# Patient Record
Sex: FEMALE | Race: WHITE | NOT HISPANIC OR LATINO | Employment: FULL TIME | URBAN - METROPOLITAN AREA
[De-identification: names, ages, dates, MRNs, and addresses within clinical notes are randomized per-mention and may not be internally consistent; named-entity substitution may affect disease eponyms.]

---

## 2017-08-03 ENCOUNTER — ALLSCRIPTS OFFICE VISIT (OUTPATIENT)
Dept: OTHER | Facility: OTHER | Age: 32
End: 2017-08-03

## 2017-08-14 ENCOUNTER — GENERIC CONVERSION - ENCOUNTER (OUTPATIENT)
Dept: OTHER | Facility: OTHER | Age: 32
End: 2017-08-14

## 2017-08-14 ENCOUNTER — APPOINTMENT (OUTPATIENT)
Dept: PHYSICAL THERAPY | Facility: CLINIC | Age: 32
End: 2017-08-14
Payer: COMMERCIAL

## 2017-08-14 PROCEDURE — 97140 MANUAL THERAPY 1/> REGIONS: CPT

## 2017-08-14 PROCEDURE — 97161 PT EVAL LOW COMPLEX 20 MIN: CPT

## 2018-01-13 VITALS
HEIGHT: 69 IN | SYSTOLIC BLOOD PRESSURE: 128 MMHG | DIASTOLIC BLOOD PRESSURE: 86 MMHG | HEART RATE: 80 BPM | BODY MASS INDEX: 43.4 KG/M2 | RESPIRATION RATE: 18 BRPM | WEIGHT: 293 LBS

## 2018-02-14 ENCOUNTER — TELEPHONE (OUTPATIENT)
Dept: FAMILY MEDICINE CLINIC | Facility: CLINIC | Age: 33
End: 2018-02-14

## 2018-02-15 DIAGNOSIS — I10 BENIGN ESSENTIAL HTN: Primary | ICD-10-CM

## 2018-03-30 ENCOUNTER — OFFICE VISIT (OUTPATIENT)
Dept: FAMILY MEDICINE CLINIC | Facility: CLINIC | Age: 33
End: 2018-03-30
Payer: COMMERCIAL

## 2018-03-30 VITALS
BODY MASS INDEX: 43.4 KG/M2 | RESPIRATION RATE: 16 BRPM | WEIGHT: 293 LBS | DIASTOLIC BLOOD PRESSURE: 98 MMHG | HEIGHT: 69 IN | HEART RATE: 96 BPM | SYSTOLIC BLOOD PRESSURE: 145 MMHG

## 2018-03-30 DIAGNOSIS — R94.31 ABNORMAL EKG: ICD-10-CM

## 2018-03-30 DIAGNOSIS — I10 ESSENTIAL HYPERTENSION: Primary | ICD-10-CM

## 2018-03-30 DIAGNOSIS — F41.8 DEPRESSION WITH ANXIETY: ICD-10-CM

## 2018-03-30 PROCEDURE — 3008F BODY MASS INDEX DOCD: CPT | Performed by: NURSE PRACTITIONER

## 2018-03-30 PROCEDURE — 93000 ELECTROCARDIOGRAM COMPLETE: CPT | Performed by: NURSE PRACTITIONER

## 2018-03-30 PROCEDURE — 99214 OFFICE O/P EST MOD 30 MIN: CPT | Performed by: NURSE PRACTITIONER

## 2018-03-30 RX ORDER — BIOTIN 1 MG
1 TABLET ORAL DAILY
COMMUNITY
End: 2018-03-30 | Stop reason: ALTCHOICE

## 2018-03-30 RX ORDER — LISINOPRIL 20 MG/1
1 TABLET ORAL DAILY
COMMUNITY
End: 2019-09-10 | Stop reason: SDUPTHER

## 2018-03-30 RX ORDER — UBIDECARENONE 75 MG
1 CAPSULE ORAL DAILY
COMMUNITY
End: 2018-03-30 | Stop reason: ALTCHOICE

## 2018-03-30 NOTE — PROGRESS NOTES
Chief Complaint   Patient presents with    Follow-up     Assessment/Plan:    1  Essential hypertension  Please resume the lisinopril and get the blood work done that you have the orders for  Please come back in 3 months to que this  - POCT ECG    2  Depression with anxiety  This is good with no meds  3  Abnormal EKG  The st changes are new form the old ekg   We have referred you to cardiology for eval as we do not know your fathers family history   rto 3 months  Subjective:      Patient ID: Blue Greene is a 28 y o  female  Here today to k on BP and depression  Has gotten a new job and is really feel ing much better in the new envrionment  Did stop the lisinopril only because she kept forgetting to take it  Will get back on track  In addition, forgot to get the blood work done  Is still working on weight loss with diet and exercise  The following portions of the patient's history were reviewed and updated as appropriate: allergies, current medications, past family history, past medical history, past social history, past surgical history and problem list     History reviewed  No pertinent past medical history  History reviewed  No pertinent surgical history  Family History   Problem Relation Age of Onset    No Known Problems Mother     No Known Problems Father     Mental illness Neg Hx     Substance Abuse Neg Hx      Social History   Social History     Social History    Marital status: Single     Spouse name: N/A    Number of children: N/A    Years of education: N/A     Occupational History    Not on file  Social History Main Topics    Smoking status: Never Smoker    Smokeless tobacco: Never Used    Alcohol use No    Drug use: No    Sexual activity: Not on file     Other Topics Concern    Not on file     Social History Narrative    No narrative on file     Review of Systems   Constitutional: Negative  Respiratory: Negative  Cardiovascular: Negative  Musculoskeletal: Negative  Skin: Negative  Neurological: Negative  Hematological: Negative  Psychiatric/Behavioral: Negative  Vitals:    03/30/18 1452   BP: 120/80   BP Location: Left arm   Patient Position: Sitting   Pulse: 96   Resp: 16   Weight: 133 kg (293 lb)   Height: 5' 8 5" (1 74 m)       Objective:  Office Visit on 03/30/2018   Component Date Value Ref Range Status    OTHER 03/30/2018    Final    03/30/2018          Physical Exam   Constitutional: She is oriented to person, place, and time  She appears well-developed and well-nourished  Neck: Normal range of motion  Neck supple  Cardiovascular: Normal rate, regular rhythm, normal heart sounds and intact distal pulses  Pulmonary/Chest: Effort normal and breath sounds normal  No respiratory distress  She has no wheezes  She has no rales  Musculoskeletal: Normal range of motion  Neurological: She is alert and oriented to person, place, and time  Skin: Skin is warm and dry  Psychiatric: She has a normal mood and affect   Her behavior is normal  Judgment and thought content normal

## 2019-08-23 DIAGNOSIS — I10 ESSENTIAL HYPERTENSION: Primary | ICD-10-CM

## 2019-09-10 ENCOUNTER — TELEPHONE (OUTPATIENT)
Dept: FAMILY MEDICINE CLINIC | Facility: CLINIC | Age: 34
End: 2019-09-10

## 2019-09-10 RX ORDER — LISINOPRIL 20 MG/1
20 TABLET ORAL DAILY
Qty: 30 TABLET | Refills: 0 | Status: SHIPPED | OUTPATIENT
Start: 2019-09-10 | End: 2019-10-03 | Stop reason: SDUPTHER

## 2019-09-10 NOTE — TELEPHONE ENCOUNTER
Patient called back and booked an appointment on 10/3/2019  In the meantime her Lisinopril ran out, can you refill for # 30 only? Also, she mentioned she had to get labs done    Can you enter an order for labs you want done for her visit     She uses [facility]          Pt # [phone]

## 2019-09-23 ENCOUNTER — TELEPHONE (OUTPATIENT)
Dept: FAMILY MEDICINE CLINIC | Facility: CLINIC | Age: 34
End: 2019-09-23

## 2019-10-03 ENCOUNTER — OFFICE VISIT (OUTPATIENT)
Dept: FAMILY MEDICINE CLINIC | Facility: CLINIC | Age: 34
End: 2019-10-03
Payer: COMMERCIAL

## 2019-10-03 VITALS
BODY MASS INDEX: 42.8 KG/M2 | WEIGHT: 289 LBS | SYSTOLIC BLOOD PRESSURE: 128 MMHG | RESPIRATION RATE: 14 BRPM | HEIGHT: 69 IN | HEART RATE: 88 BPM | DIASTOLIC BLOOD PRESSURE: 88 MMHG

## 2019-10-03 DIAGNOSIS — I10 ESSENTIAL HYPERTENSION: Primary | ICD-10-CM

## 2019-10-03 DIAGNOSIS — E66.01 MORBID OBESITY WITH BMI OF 40.0-44.9, ADULT (HCC): ICD-10-CM

## 2019-10-03 LAB
ALBUMIN SERPL-MCNC: 4.2 G/DL (ref 3.6–5.1)
ALBUMIN/GLOB SERPL: 1.7 (CALC) (ref 1–2.5)
ALP SERPL-CCNC: 54 U/L (ref 33–115)
ALT SERPL-CCNC: 13 U/L (ref 6–29)
AST SERPL-CCNC: 15 U/L (ref 10–30)
BASOPHILS # BLD AUTO: 52 CELLS/UL (ref 0–200)
BASOPHILS NFR BLD AUTO: 0.8 %
BILIRUB SERPL-MCNC: 0.9 MG/DL (ref 0.2–1.2)
BUN SERPL-MCNC: 9 MG/DL (ref 7–25)
BUN/CREAT SERPL: NORMAL (CALC) (ref 6–22)
CALCIUM SERPL-MCNC: 9.2 MG/DL (ref 8.6–10.2)
CHLORIDE SERPL-SCNC: 101 MMOL/L (ref 98–110)
CHOLEST SERPL-MCNC: 183 MG/DL
CHOLEST/HDLC SERPL: 3.3 (CALC)
CO2 SERPL-SCNC: 30 MMOL/L (ref 20–32)
CREAT SERPL-MCNC: 0.85 MG/DL (ref 0.5–1.1)
EOSINOPHIL # BLD AUTO: 202 CELLS/UL (ref 15–500)
EOSINOPHIL NFR BLD AUTO: 3.1 %
ERYTHROCYTE [DISTWIDTH] IN BLOOD BY AUTOMATED COUNT: 12.4 % (ref 11–15)
GLOBULIN SER CALC-MCNC: 2.5 G/DL (CALC) (ref 1.9–3.7)
GLUCOSE SERPL-MCNC: 79 MG/DL (ref 65–99)
HCT VFR BLD AUTO: 41.8 % (ref 35–45)
HDLC SERPL-MCNC: 56 MG/DL
HGB BLD-MCNC: 13.8 G/DL (ref 11.7–15.5)
LDLC SERPL CALC-MCNC: 108 MG/DL (CALC)
LYMPHOCYTES # BLD AUTO: 2509 CELLS/UL (ref 850–3900)
LYMPHOCYTES NFR BLD AUTO: 38.6 %
MCH RBC QN AUTO: 29.1 PG (ref 27–33)
MCHC RBC AUTO-ENTMCNC: 33 G/DL (ref 32–36)
MCV RBC AUTO: 88.2 FL (ref 80–100)
MONOCYTES # BLD AUTO: 332 CELLS/UL (ref 200–950)
MONOCYTES NFR BLD AUTO: 5.1 %
NEUTROPHILS # BLD AUTO: 3406 CELLS/UL (ref 1500–7800)
NEUTROPHILS NFR BLD AUTO: 52.4 %
NONHDLC SERPL-MCNC: 127 MG/DL (CALC)
PLATELET # BLD AUTO: 290 THOUSAND/UL (ref 140–400)
PMV BLD REES-ECKER: 10.1 FL (ref 7.5–12.5)
POTASSIUM SERPL-SCNC: 4.2 MMOL/L (ref 3.5–5.3)
PROT SERPL-MCNC: 6.7 G/DL (ref 6.1–8.1)
RBC # BLD AUTO: 4.74 MILLION/UL (ref 3.8–5.1)
SL AMB EGFR AFRICAN AMERICAN: 104 ML/MIN/1.73M2
SL AMB EGFR NON AFRICAN AMERICAN: 90 ML/MIN/1.73M2
SODIUM SERPL-SCNC: 138 MMOL/L (ref 135–146)
TRIGL SERPL-MCNC: 93 MG/DL
TSH SERPL-ACNC: 3.57 MIU/L
WBC # BLD AUTO: 6.5 THOUSAND/UL (ref 3.8–10.8)

## 2019-10-03 PROCEDURE — 3079F DIAST BP 80-89 MM HG: CPT | Performed by: NURSE PRACTITIONER

## 2019-10-03 PROCEDURE — 99214 OFFICE O/P EST MOD 30 MIN: CPT | Performed by: NURSE PRACTITIONER

## 2019-10-03 PROCEDURE — 3074F SYST BP LT 130 MM HG: CPT | Performed by: NURSE PRACTITIONER

## 2019-10-03 PROCEDURE — 3008F BODY MASS INDEX DOCD: CPT | Performed by: NURSE PRACTITIONER

## 2019-10-03 RX ORDER — LISINOPRIL 20 MG/1
20 TABLET ORAL DAILY
Qty: 90 TABLET | Refills: 1 | Status: SHIPPED | OUTPATIENT
Start: 2019-10-03 | End: 2022-03-22 | Stop reason: ALTCHOICE

## 2019-10-03 NOTE — PROGRESS NOTES
Chief Complaint   Patient presents with    Hypertension     Assessment/Plan:    1  Essential hypertension  Continue the lisinopril and continue to work on the lifestyle changes   try to follow your BP at home and call if you have any questions  We will see you back in 6 months to que with labs prior  Call if you have questions  2  Morbid obesity with BMI of 40 0-44 9, adult ([address] )  Please watch your diet and increase exercise as we discussed  Please let us know if there is anythign we can do to help you       rto 6 months   Subjective:      Patient ID: Mark Bran is a 35 y o  female  Here today to que on her BP   Has not been here in over a year and has not taken the lisinopril in about 6 months  States that she had employer change and insuanrce change and just got busy  Has not been checking her BP at home  resumed lisinopril about 1 1/2 weeks ago and did get her labs as requested  Has no complaints  The following portions of the patient's history were reviewed and updated as appropriate: allergies, current medications, past family history, past medical history, past social history, past surgical history and problem list     History reviewed  No pertinent past medical history    Past Surgical History:   Procedure Laterality Date    NO PAST SURGERIES       Family History   Problem Relation Age of Onset    No Known Problems Mother     Cancer Father     Alcohol abuse Father         alcoholism    Mental illness Neg Hx     Substance Abuse Neg Hx      Social History   Social History     Socioeconomic History    Marital status: Single     Spouse name: Not on file    Number of children: Not on file    Years of education: Not on file    Highest education level: Not on file   Occupational History    Not on file   Social Needs    Financial resource strain: Not on file    Food insecurity:     Worry: Not on file     Inability: Not on file    Transportation needs:     Medical: Not on file Non-medical: Not on file   Tobacco Use    Smoking status: Never Smoker    Smokeless tobacco: Never Used   Substance and Sexual Activity    Alcohol use: No    Drug use: No     Comment: uses marijuana (as per allscripts)    Sexual activity: Not on file   Lifestyle    Physical activity:     Days per week: Not on file     Minutes per session: Not on file    Stress: Not on file   Relationships    Social connections:     Talks on phone: Not on file     Gets together: Not on file     Attends Zoroastrian service: Not on file     Active member of club or organization: Not on file     Attends meetings of clubs or organizations: Not on file     Relationship status: Not on file    Intimate partner violence:     Fear of current or ex partner: Not on file     Emotionally abused: Not on file     Physically abused: Not on file     Forced sexual activity: Not on file   Other Topics Concern    Not on file   Social History Narrative    Always uses seat belt    Daily caffeinated coffee consumption-3-4 cups coffee or tea daily    Denied: history of has smoke detectors     Review of Systems   Constitutional: Negative  Respiratory: Negative  Cardiovascular: Negative  Musculoskeletal: Negative  Skin: Negative  Neurological: Negative  Psychiatric/Behavioral: Negative  Vitals:    10/03/19 1519   BP: 128/88   BP Location: Left arm   Patient Position: Sitting   Pulse: 88   Resp: 14   Weight: 131 kg (289 lb)   Height: 5' 8 5" (1 74 m)       Objective:   Wt Readings from Last 3 Encounters:   10/03/19 131 kg (289 lb)   03/30/18 133 kg (293 lb)   08/03/17 134 kg (295 lb 9 6 oz)     BP Readings from Last 3 Encounters:   10/03/19 128/88   03/30/18 145/98   08/03/17 128/86     Pulse Readings from Last 3 Encounters:   10/03/19 88   03/30/18 96   08/03/17 80     BMI Readings from Last 3 Encounters:   10/03/19 43 30 kg/m²   03/30/18 43 90 kg/m²   08/03/17 43 65 kg/m²     Refill on 08/23/2019   Component Date Value Ref Range Status    Total Cholesterol 10/02/2019 183  <200 mg/dL Final    HDL 10/02/2019 56  >50 mg/dL Final    Triglycerides 10/02/2019 93  <150 mg/dL Final    LDL Direct 10/02/2019 108* mg/dL (calc) Final    Comment: Reference range: <100     Desirable range <100 mg/dL for primary prevention;    <70 mg/dL for patients with CHD or diabetic patients   with > or = 2 CHD risk factors  LDL-C is now calculated using the Demian-Benites   calculation, which is a validated novel method providing   better accuracy than the Friedewald equation in the   estimation of LDL-C  Mevelyn Dakins et al  Anali Watters  2607;009(68): 2248-3181   (http://[employer]/faq/PTO998)      Chol HDLC Ratio 10/02/2019 3 3  <5 0 (calc) Final    Non-HDL Cholesterol 10/02/2019 127  <130 mg/dL (calc) Final    Comment: For patients with diabetes plus 1 major ASCVD risk   factor, treating to a non-HDL-C goal of <100 mg/dL   (LDL-C of <70 mg/dL) is considered a therapeutic   option        White Blood Cell Count 10/02/2019 6 5  3 8 - 10 8 Thousand/uL Final    Red Blood Cell Count 10/02/2019 4 74  3 80 - 5 10 Million/uL Final    Hemoglobin 10/02/2019 13 8  11 7 - 15 5 g/dL Final    HCT 10/02/2019 41 8  35 0 - 45 0 % Final    MCV 10/02/2019 88 2  80 0 - 100 0 fL Final    MCH 10/02/2019 29 1  27 0 - 33 0 pg Final    MCHC 10/02/2019 33 0  32 0 - 36 0 g/dL Final    RDW 10/02/2019 12 4  11 0 - 15 0 % Final    Platelet Count 72/97/5081 290  140 - 400 Thousand/uL Final    SL AMB MPV 10/02/2019 10 1  7 5 - 12 5 fL Final    Neutrophils (Absolute) 10/02/2019 3,406  1,500 - 7,800 cells/uL Final    Lymphocytes (Absolute) 10/02/2019 2,509  850 - 3,900 cells/uL Final    Monocytes (Absolute) 10/02/2019 332  200 - 950 cells/uL Final    Eosinophils (Absolute) 10/02/2019 202  15 - 500 cells/uL Final    Basophils ABS 10/02/2019 52  0 - 200 cells/uL Final    Neutrophils 10/02/2019 52 4  % Final    Lymphocytes 10/02/2019 38 6  % Final    Monocytes 10/02/2019 5 1  % Final    Eosinophils 10/02/2019 3 1  % Final    Basophils PCT 10/02/2019 0 8  % Final    Glucose, Random 10/02/2019 79  65 - 99 mg/dL Final    Comment:               Fasting reference interval         BUN 10/02/2019 9  7 - 25 mg/dL Final    Creatinine 10/02/2019 0 85  0 50 - 1 10 mg/dL Final    eGFR Non African American 10/02/2019 90  > OR = 60 mL/min/1 73m2 Final    eGFR African American 10/02/2019 104  > OR = 60 mL/min/1 73m2 Final    SL AMB BUN/CREATININE RATIO 63/43/1657 NOT APPLICABLE  6 - 22 (calc) Final    Sodium 10/02/2019 138  135 - 146 mmol/L Final    Potassium 10/02/2019 4 2  3 5 - 5 3 mmol/L Final    Chloride 10/02/2019 101  98 - 110 mmol/L Final    CO2 10/02/2019 30  20 - 32 mmol/L Final    SL AMB CALCIUM 10/02/2019 9 2  8 6 - 10 2 mg/dL Final    Protein, Total 10/02/2019 6 7  6 1 - 8 1 g/dL Final    Albumin 10/02/2019 4 2  3 6 - 5 1 g/dL Final    Globulin 10/02/2019 2 5  1 9 - 3 7 g/dL (calc) Final    Albumin/Globulin Ratio 10/02/2019 1 7  1 0 - 2 5 (calc) Final    TOTAL BILIRUBIN 10/02/2019 0 9  0 2 - 1 2 mg/dL Final    Alkaline Phosphatase 10/02/2019 54  33 - 115 U/L Final    AST 10/02/2019 15  10 - 30 U/L Final    ALT 10/02/2019 13  6 - 29 U/L Final    TSH W/RFX TO FREE T4 10/02/2019 3 57  mIU/L Final    Comment:           Reference Range                         > or = 20 Years  0 40-4 50                              Pregnancy Ranges            First trimester    0 26-2 66            Second trimester   0 55-2 73            Third trimester    0 43-2 91     Office Visit on 03/30/2018   Component Date Value Ref Range Status    OTHER 03/30/2018    Final    03/30/2018          Physical Exam   Constitutional: She is oriented to person, place, and time  She appears well-developed and well-nourished  Neck: Normal range of motion  Neck supple  No thyromegaly present  Cardiovascular: Normal rate, regular rhythm, S1 normal and normal heart sounds   Exam reveals no distant heart sounds  Pulmonary/Chest: Effort normal and breath sounds normal  No respiratory distress  She has no wheezes  Musculoskeletal: Normal range of motion  Neurological: She is alert and oriented to person, place, and time  Skin: Skin is warm and dry  Psychiatric: She has a normal mood and affect  Her behavior is normal  Judgment and thought content normal        BMI Counseling: Body mass index is 43 3 kg/m²  The BMI is above normal  Nutrition recommendations include reducing portion sizes, decreasing overall calorie intake and 3-5 servings of fruits/vegetables daily  Exercise recommendations include moderate aerobic physical activity for 150 minutes/week

## 2020-04-10 ENCOUNTER — TELEMEDICINE (OUTPATIENT)
Dept: FAMILY MEDICINE CLINIC | Facility: CLINIC | Age: 35
End: 2020-04-10
Payer: COMMERCIAL

## 2020-04-10 VITALS — WEIGHT: 284 LBS | BODY MASS INDEX: 42.55 KG/M2

## 2020-04-10 DIAGNOSIS — I10 ESSENTIAL HYPERTENSION: Primary | ICD-10-CM

## 2020-04-10 PROCEDURE — G2012 BRIEF CHECK IN BY MD/QHP: HCPCS | Performed by: NURSE PRACTITIONER

## 2020-07-24 ENCOUNTER — NURSE TRIAGE (OUTPATIENT)
Dept: OTHER | Facility: OTHER | Age: 35
End: 2020-07-24

## 2020-07-25 NOTE — TELEPHONE ENCOUNTER
Regarding: COVID symptoms  ----- Message from Kishor Rebolledo sent at 7/24/2020  7:33 PM EDT -----  "I have cold like symptoms; congestion and tired   Someone suggested I get tested for COVID "

## 2020-07-25 NOTE — TELEPHONE ENCOUNTER
Reason for Disposition   [1] COVID-19 infection diagnosed or suspected AND [2] mild symptoms (fever, cough) AND [3] no trouble breathing or other complications    Answer Assessment - Initial Assessment Questions  1  COVID-19 DIAGNOSIS: "Who made your Coronavirus (COVID-19) diagnosis?" "Was it confirmed by a positive lab test?" If not diagnosed by a HCP, ask "Are there lots of cases (community spread) where you live?" (See public health department website, if unsure)    * MAJOR community spread: high number of cases; numbers of cases are increasing; many people hospitalized  * MINOR community spread: low number of cases; not increasing; few or no people hospitalized      yes  2  ONSET: "When did the COVID-19 symptoms start?"       7/21/20  3  WORST SYMPTOM: "What is your worst symptom?" (e g , cough, fever, shortness of breath, muscle aches)      congestion  4  COUGH: "Do you have a cough?" If so, ask: "How bad is the cough?"        Not really  5  FEVER: "Do you have a fever?" If so, ask: "What is your temperature, how was it measured, and when did it start?"      no  6  RESPIRATORY STATUS: "Describe your breathing?" (e g , shortness of breath, wheezing, unable to speak)       no  7  BETTER-SAME-WORSE: "Are you getting better, staying the same or getting worse compared to yesterday?"  If getting worse, ask, "In what way?"      better  8  HIGH RISK DISEASE: "Do you have any chronic medical problems?" (e g , asthma, heart or lung disease, weak immune system, etc )      High BP  9  PREGNANCY: "Is there any chance you are pregnant?" "When was your last menstrual period?"      No, IUD in place so no regular period  10  OTHER SYMPTOMS: "Do you have any other symptoms?"  (e g , runny nose, headache, sore throat, loss of smell)        Slight Headache earlier today    Protocols used: CORONAVIRUS (COVID-19) - DIAGNOSED OR SUSPECTED-ADULT-  pt having mild cold symptoms and concerned about covid   Care advice given, no test indicated at this time  Pt agrees with plan

## 2021-03-01 ENCOUNTER — TELEPHONE (OUTPATIENT)
Dept: FAMILY MEDICINE CLINIC | Facility: CLINIC | Age: 36
End: 2021-03-01

## 2021-03-01 NOTE — TELEPHONE ENCOUNTER
Patient went to [facility] and got tested for covid  She is positive  Last contact with someone positive was 2/22  Patient is experiencing body aches fatigue congestion lack of smell and taste  Other then that patient feels good  Symptoms started 2/26  Patient is wondering how long she needs to quarantine

## 2021-05-23 ENCOUNTER — IMMUNIZATIONS (OUTPATIENT)
Dept: FAMILY MEDICINE CLINIC | Facility: HOSPITAL | Age: 36
End: 2021-05-23

## 2021-05-23 DIAGNOSIS — Z23 ENCOUNTER FOR IMMUNIZATION: Primary | ICD-10-CM

## 2021-05-23 PROCEDURE — 91300 SARS-COV-2 / COVID-19 MRNA VACCINE (PFIZER-BIONTECH) 30 MCG: CPT

## 2021-05-23 PROCEDURE — 0001A SARS-COV-2 / COVID-19 MRNA VACCINE (PFIZER-BIONTECH) 30 MCG: CPT

## 2021-06-22 ENCOUNTER — IMMUNIZATIONS (OUTPATIENT)
Dept: FAMILY MEDICINE CLINIC | Facility: HOSPITAL | Age: 36
End: 2021-06-22

## 2021-06-22 DIAGNOSIS — Z23 ENCOUNTER FOR IMMUNIZATION: Primary | ICD-10-CM

## 2021-06-22 PROCEDURE — 0002A SARS-COV-2 / COVID-19 MRNA VACCINE (PFIZER-BIONTECH) 30 MCG: CPT

## 2021-06-22 PROCEDURE — 91300 SARS-COV-2 / COVID-19 MRNA VACCINE (PFIZER-BIONTECH) 30 MCG: CPT

## 2021-10-20 ENCOUNTER — OFFICE VISIT (OUTPATIENT)
Dept: FAMILY MEDICINE CLINIC | Facility: CLINIC | Age: 36
End: 2021-10-20
Payer: COMMERCIAL

## 2021-10-20 VITALS
HEIGHT: 68 IN | DIASTOLIC BLOOD PRESSURE: 82 MMHG | WEIGHT: 282.2 LBS | HEART RATE: 84 BPM | BODY MASS INDEX: 42.77 KG/M2 | RESPIRATION RATE: 16 BRPM | SYSTOLIC BLOOD PRESSURE: 128 MMHG

## 2021-10-20 DIAGNOSIS — L08.9 SKIN INFECTION: ICD-10-CM

## 2021-10-20 DIAGNOSIS — Z23 NEED FOR VACCINATION: ICD-10-CM

## 2021-10-20 DIAGNOSIS — E66.01 MORBID OBESITY WITH BMI OF 40.0-44.9, ADULT (HCC): Primary | ICD-10-CM

## 2021-10-20 PROCEDURE — 90471 IMMUNIZATION ADMIN: CPT

## 2021-10-20 PROCEDURE — 99213 OFFICE O/P EST LOW 20 MIN: CPT | Performed by: PHYSICIAN ASSISTANT

## 2021-10-20 PROCEDURE — 90714 TD VACC NO PRESV 7 YRS+ IM: CPT

## 2021-10-20 PROCEDURE — 1036F TOBACCO NON-USER: CPT | Performed by: PHYSICIAN ASSISTANT

## 2021-10-20 PROCEDURE — 3008F BODY MASS INDEX DOCD: CPT | Performed by: PHYSICIAN ASSISTANT

## 2021-10-20 RX ORDER — CEPHALEXIN 500 MG/1
1000 CAPSULE ORAL EVERY 12 HOURS SCHEDULED
Qty: 28 CAPSULE | Refills: 0 | Status: SHIPPED | OUTPATIENT
Start: 2021-10-20 | End: 2021-10-26 | Stop reason: SDUPTHER

## 2021-10-26 ENCOUNTER — TELEPHONE (OUTPATIENT)
Dept: FAMILY MEDICINE CLINIC | Facility: CLINIC | Age: 36
End: 2021-10-26

## 2021-10-26 DIAGNOSIS — L08.9 SKIN INFECTION: ICD-10-CM

## 2021-10-26 RX ORDER — CEPHALEXIN 500 MG/1
1000 CAPSULE ORAL EVERY 12 HOURS SCHEDULED
Qty: 28 CAPSULE | Refills: 0 | Status: SHIPPED | OUTPATIENT
Start: 2021-10-26 | End: 2021-11-02

## 2021-12-21 ENCOUNTER — OFFICE VISIT (OUTPATIENT)
Dept: OBGYN CLINIC | Facility: CLINIC | Age: 36
End: 2021-12-21
Payer: COMMERCIAL

## 2021-12-21 VITALS
DIASTOLIC BLOOD PRESSURE: 80 MMHG | BODY MASS INDEX: 40.92 KG/M2 | WEIGHT: 270 LBS | HEIGHT: 68 IN | SYSTOLIC BLOOD PRESSURE: 120 MMHG

## 2021-12-21 DIAGNOSIS — Z20.2 POSSIBLE EXPOSURE TO STD: ICD-10-CM

## 2021-12-21 DIAGNOSIS — B37.3 VAGINAL YEAST INFECTION: ICD-10-CM

## 2021-12-21 DIAGNOSIS — Z97.5 IUD (INTRAUTERINE DEVICE) IN PLACE: Primary | ICD-10-CM

## 2021-12-21 LAB
BV WHIFF TEST VAG QL: ABNORMAL
CLUE CELLS SPEC QL WET PREP: ABNORMAL
PH SMN: 4.5 [PH]
SL AMB POCT WET MOUNT: ABNORMAL
T VAGINALIS VAG QL WET PREP: ABNORMAL
YEAST VAG QL WET PREP: ABNORMAL

## 2021-12-21 PROCEDURE — 3008F BODY MASS INDEX DOCD: CPT | Performed by: NURSE PRACTITIONER

## 2021-12-21 PROCEDURE — 1036F TOBACCO NON-USER: CPT | Performed by: NURSE PRACTITIONER

## 2021-12-21 PROCEDURE — 87210 SMEAR WET MOUNT SALINE/INK: CPT | Performed by: NURSE PRACTITIONER

## 2021-12-21 PROCEDURE — 99212 OFFICE O/P EST SF 10 MIN: CPT | Performed by: NURSE PRACTITIONER

## 2021-12-21 RX ORDER — FLUCONAZOLE 150 MG/1
150 TABLET ORAL ONCE
Qty: 2 TABLET | Refills: 0 | Status: SHIPPED | OUTPATIENT
Start: 2021-12-21 | End: 2021-12-21

## 2021-12-23 LAB
C TRACH RRNA SPEC QL NAA+PROBE: NOT DETECTED
N GONORRHOEA RRNA SPEC QL NAA+PROBE: NOT DETECTED

## 2021-12-29 ENCOUNTER — VBI (OUTPATIENT)
Dept: ADMINISTRATIVE | Facility: OTHER | Age: 36
End: 2021-12-29

## 2022-03-22 ENCOUNTER — OFFICE VISIT (OUTPATIENT)
Dept: FAMILY MEDICINE CLINIC | Facility: CLINIC | Age: 37
End: 2022-03-22
Payer: COMMERCIAL

## 2022-03-22 VITALS
HEART RATE: 84 BPM | RESPIRATION RATE: 16 BRPM | SYSTOLIC BLOOD PRESSURE: 126 MMHG | WEIGHT: 266.2 LBS | BODY MASS INDEX: 40.35 KG/M2 | HEIGHT: 68 IN | DIASTOLIC BLOOD PRESSURE: 82 MMHG

## 2022-03-22 DIAGNOSIS — M25.552 ACUTE PAIN OF LEFT HIP: Primary | ICD-10-CM

## 2022-03-22 PROCEDURE — 1036F TOBACCO NON-USER: CPT | Performed by: PHYSICIAN ASSISTANT

## 2022-03-22 PROCEDURE — 3008F BODY MASS INDEX DOCD: CPT | Performed by: PHYSICIAN ASSISTANT

## 2022-03-22 PROCEDURE — 99213 OFFICE O/P EST LOW 20 MIN: CPT | Performed by: PHYSICIAN ASSISTANT

## 2022-03-22 NOTE — PROGRESS NOTES
Assessment/Plan:    1  Acute pain of left hip    - over use injury, will refer to PT for strengthening  - Ambulatory Referral to Physical Therapy; Future    F/u as needed      Subjective:   Chief Complaint   Patient presents with    Left hip and buttock pain      Patient ID: Radha Ruiz is a 39 y o  female  Patient started with left hip pain, spending a lot of time on her foot  Has had this in the past but would come and go  Now its constant  Was doing a lot of walking on bad floors  Started in groin now into back left buttock  Small bruise  Pain rates about a sharp, to ache  Constant during the week  Does not notice on weekend  No pain while sitting  Walking makes it worse  Yesterday felt good in morning, worse as the day went on  [place] ok walking worse after done  Take ibuprofen with some relief  Taking twice daily now with some relief        The following portions of the patient's history were reviewed and updated as appropriate: allergies, current medications, past family history, past medical history, past social history, past surgical history and problem list     Past Medical History:   Diagnosis Date    Hypertension     Morbid obesity ([address] )      Past Surgical History:   Procedure Laterality Date    NO PAST SURGERIES       Family History   Problem Relation Age of Onset    No Known Problems Mother     Cancer Father     Alcohol abuse Father         alcoholism    Mental illness Neg Hx     Substance Abuse Neg Hx      Social History     Socioeconomic History    Marital status: Single     Spouse name: Not on file    Number of children: Not on file    Years of education: Not on file    Highest education level: Not on file   Occupational History    Not on file   Tobacco Use    Smoking status: Never Smoker    Smokeless tobacco: Never Used   Vaping Use    Vaping Use: Never used   Substance and Sexual Activity    Alcohol use: No    Drug use: No     Comment: uses marijuana (as per allscripts)    Sexual activity: Yes     Partners: Male     Birth control/protection: I U D  Other Topics Concern    Not on file   Social History Narrative    Always uses seat belt    Daily caffeinated coffee consumption-3-4 cups coffee or tea daily    Denied: history of has smoke detectors     Social Determinants of Health     Financial Resource Strain: Not on file   Food Insecurity: Not on file   Transportation Needs: Not on file   Physical Activity: Not on file   Stress: Not on file   Social Connections: Not on file   Intimate Partner Violence: Not on file   Housing Stability: Not on file       Current Outpatient Medications:     levonorgestrel (MIRENA, 52 MG,) 20 MCG/24HR IUD, by Intrauterine route, Disp: , Rfl:     Review of Systems          Objective:    Vitals:    03/22/22 1151   BP: 126/82   BP Location: Left arm   Patient Position: Sitting   Cuff Size: Large   Pulse: 84   Resp: 16   Weight: 121 kg (266 lb 3 2 oz)   Height: 5' 8 25" (1 734 m)        Physical Exam  Constitutional:       Appearance: Normal appearance  She is obese  HENT:      Head: Normocephalic and atraumatic  Cardiovascular:      Rate and Rhythm: Normal rate and regular rhythm  Pulses: Normal pulses  Heart sounds: Normal heart sounds  Musculoskeletal:         General: Normal range of motion  Comments: Pain perceived left groin and left gluteal muscles, full ROM without pain  Neurological:      General: No focal deficit present  Mental Status: She is alert and oriented to person, place, and time  Psychiatric:         Mood and Affect: Mood normal          Behavior: Behavior normal          Thought Content:  Thought content normal          Judgment: Judgment normal

## 2022-03-23 ENCOUNTER — EVALUATION (OUTPATIENT)
Dept: PHYSICAL THERAPY | Facility: CLINIC | Age: 37
End: 2022-03-23
Payer: COMMERCIAL

## 2022-03-23 DIAGNOSIS — M25.552 ACUTE PAIN OF LEFT HIP: ICD-10-CM

## 2022-03-23 PROCEDURE — 97161 PT EVAL LOW COMPLEX 20 MIN: CPT | Performed by: PHYSICAL THERAPIST

## 2022-03-23 NOTE — PROGRESS NOTES
PT Evaluation     Today's date: 3/23/2022  Patient name: Tomas Mccollum  : 1985  MRN: [id number]  Referring provider: Jerome Paniagua PA-C  Dx:   Encounter Diagnosis     ICD-10-CM    1  Acute pain of left hip  M25 552 Ambulatory Referral to Physical Therapy                  Assessment  Assessment details: Tomas Mccollum is a 39 y o  female who presents with pain, decreased strength, and decreased ROM  Due to these impairments, patient has difficulty performing a/iadls, recreational activities and work-related activities  Patient's clinical presentation is consistent with their referring diagnosis of left hip pain  Patient would benefit from skilled physical therapy to address their aforementioned impairments, improve their level of function and to improve their overall quality of life  Patient states she wishes to perform exercises independently at home and perform 2-3 additional follow up visits secondary to high insurance deductible  Impairments: abnormal or restricted ROM, impaired physical strength and pain with function  Understanding of Dx/Px/POC: good   Prognosis: good    Goals  Short term goals - to be achieved in 4 weeks:     Decrease pain 20-50%  Increase strength by 1/2 grade  Improve range of motion by 25%  Long term goals - to be achieved by discharge:    Squatting is improved to maximal level of function  IADL performance in related activities is improved to maximal level of function  Performance in related work activities is improved to maximal level of function  Plan  Planned therapy interventions: manual therapy, neuromuscular re-education, patient education, stretching, therapeutic activities, therapeutic exercise, therapeutic training and home exercise program  Frequency: 2-3 additional visits    Plan of Care beginning date: 3/23/2022  Plan of Care expiration date: 2022  Treatment plan discussed with: patient        Subjective Evaluation    History of Present Illness  Mechanism of injury: Patient refers to PT with c/o pain in her left hip which began approximately two months previous of insidious onset  Patient c/o pain in her left buttocks, lateral hip, and groin area  Patient has not received imaging of her left hip at the current time  Patient denies numbness and tingling in her left LE  Patient denies instability of her left LE  Patient works full time in [employer] in which she performs frequent lifting activities; states pain with completion of job activities  Patient states no significant pain with stair climbing activities or squatting  Pain  Current pain ratin  At best pain ratin  At worst pain ratin  Quality: sharp and dull ache  Relieving factors: medications  Exacerbated by: Work activities  Patient Goals  Patient goals for therapy: decreased pain          Objective     Active Range of Motion   Left Hip   Flexion: 110 degrees   Abduction: 45 degrees   External rotation (90/90): 40 degrees   Internal rotation (90/90): 35 degrees     Passive Range of Motion   Left Hip   Flexion: 115 degrees   Abduction: 50 degrees   External rotation (90/90): 45 degrees   Internal rotation (90/90): 40 degrees     Strength/Myotome Testing     Left Hip   Planes of Motion   Flexion: 4-  Extension: 4-  Abduction: 4  Adduction: 4  External rotation: 4-  Internal rotation: 4-    Left Knee   Flexion: 5  Extension: 5    Left Ankle/Foot   Dorsiflexion: 5  Plantar flexion: 5    Tests     Left Hip   Positive KIMBERLY and scour  Precautions: None  ** Limit billing to 1-2 units per visit secondary to high insurance co-payment**      Manuals 3/23                                                                Neuro Re-Ed             Step ups             Lateral step ups             SLS on foam                                                                 Ther Ex             Bike             Supine Piriformis str   HEP            SLR flex HEP            SLR abd HEP            Mini squats HEP            Clamshells             Reverse clamshells                          Ther Activity                                       Gait Training                                       Modalities                                           HEP access code: [email or web address]

## 2022-04-06 ENCOUNTER — OFFICE VISIT (OUTPATIENT)
Dept: PHYSICAL THERAPY | Facility: CLINIC | Age: 37
End: 2022-04-06
Payer: COMMERCIAL

## 2022-04-06 DIAGNOSIS — M25.552 ACUTE PAIN OF LEFT HIP: Primary | ICD-10-CM

## 2022-04-06 PROCEDURE — 97110 THERAPEUTIC EXERCISES: CPT

## 2022-04-06 NOTE — PROGRESS NOTES
Daily Note     Today's date: 2022  Patient name: Etelvina Easton  : 1985  MRN: [id number]  Referring provider: Konnie Seip, PA-C  Dx:   Encounter Diagnosis     ICD-10-CM    1  Acute pain of left hip  M25 552                   Subjective: Pt states feeling great  Objective: See treatment diary below      Assessment: Review HEP with pt, answered pt questions and concerns to pt satification  Plan: D/C to HEP     Precautions: None  ** Limit billing to 1-2 units per visit secondary to high insurance co-payment**      Manuals 3/23 4/6                                                               Neuro Re-Ed             Step ups             Lateral step ups             SLS on foam                                                                 Ther Ex             Bike             Supine Piriformis str   HEP HEP           SLR flex HEP HEP           SLR abd HEP HEP           Mini squats HEP HEP           Clamshells  HEP           Bridges  HEP           SKTC  HEP           Reverse clamshells                          Ther Activity                                       Gait Training                                       Modalities

## 2022-05-27 ENCOUNTER — OFFICE VISIT (OUTPATIENT)
Dept: FAMILY MEDICINE CLINIC | Facility: CLINIC | Age: 37
End: 2022-05-27
Payer: COMMERCIAL

## 2022-05-27 ENCOUNTER — TELEPHONE (OUTPATIENT)
Dept: OBGYN CLINIC | Facility: CLINIC | Age: 37
End: 2022-05-27

## 2022-05-27 VITALS
RESPIRATION RATE: 15 BRPM | WEIGHT: 272.9 LBS | SYSTOLIC BLOOD PRESSURE: 124 MMHG | HEIGHT: 68 IN | OXYGEN SATURATION: 97 % | DIASTOLIC BLOOD PRESSURE: 78 MMHG | HEART RATE: 70 BPM | BODY MASS INDEX: 41.36 KG/M2 | TEMPERATURE: 98.4 F

## 2022-05-27 DIAGNOSIS — K13.79 SORE IN MOUTH: Primary | ICD-10-CM

## 2022-05-27 DIAGNOSIS — J02.9 SORE THROAT: ICD-10-CM

## 2022-05-27 LAB — S PYO AG THROAT QL: NEGATIVE

## 2022-05-27 PROCEDURE — 87880 STREP A ASSAY W/OPTIC: CPT | Performed by: NURSE PRACTITIONER

## 2022-05-27 PROCEDURE — 99214 OFFICE O/P EST MOD 30 MIN: CPT | Performed by: NURSE PRACTITIONER

## 2022-05-27 RX ORDER — ACYCLOVIR 400 MG/1
400 TABLET ORAL 3 TIMES DAILY
Qty: 21 TABLET | Refills: 0 | Status: SHIPPED | OUTPATIENT
Start: 2022-05-27 | End: 2022-06-03

## 2022-05-27 RX ORDER — ACYCLOVIR 400 MG/1
400 TABLET ORAL 3 TIMES DAILY
Qty: 21 TABLET | Refills: 0 | Status: CANCELLED | OUTPATIENT
Start: 2022-05-27 | End: 2022-06-03

## 2022-05-27 NOTE — TELEPHONE ENCOUNTER
Spoke with Susan Bae and informed her that since the sores are only in her mouth/throat and not vaginal area after having intercourse it is best fit for her to be seen at her PCP where they can treat her appropriately  Apt cancelled  Pt aware and will contact PCP

## 2022-05-27 NOTE — PROGRESS NOTES
Assessment/Plan:     Diagnoses and all orders for this visit:    Sore in mouth  -     acyclovir (ZOVIRAX) 400 MG tablet; Take 1 tablet (400 mg total) by mouth 3 (three) times a day for 7 days    Acyclovir prescribed  Medication reviewed  Patient encouraged to keep well hydrated  Patient is encouraged to call our office for any questions/concerns, persistent or worsening symptoms  Patient states they understand and agree with treatment plan  Sore throat  -     POCT rapid strepA  -     Throat culture      Rapid strep negative  Throat culture sent  Pt to f/u PRN  Subjective:      Patient ID: Mc Contreras is a 39 y o  female  Patient presents today for sore in the back of her throat that she noticed after recent sexual contact  She denies any genital lesions or symptoms  Patient notes there are 2 posterior on the left of her uvula that she has noticed  Patient does not want any STI testing at this time  The following portions of the patient's history were reviewed and updated as appropriate: allergies, current medications, past family history, past medical history, past social history, past surgical history and problem list     Review of Systems    As noted per HPI  Objective:      /78   Pulse 70   Temp 98 4 °F (36 9 °C) (Oral)   Resp 15   Ht 5' 8 25" (1 734 m)   Wt 124 kg (272 lb 14 4 oz)   SpO2 97%   BMI 41 19 kg/m²          Physical Exam  Vitals reviewed  Constitutional:       Appearance: Normal appearance  HENT:      Mouth/Throat:      Mouth: Mucous membranes are moist  Oral lesions present  Pharynx: Posterior oropharyngeal erythema present  No oropharyngeal exudate  Tonsils: 3+ on the right  3+ on the left  Neurological:      Mental Status: She is alert and oriented to person, place, and time  Mental status is at baseline  Psychiatric:         Mood and Affect: Mood normal          Behavior: Behavior normal          Thought Content:  Thought content normal  Judgment: Judgment normal

## 2022-11-22 ENCOUNTER — OFFICE VISIT (OUTPATIENT)
Dept: FAMILY MEDICINE CLINIC | Facility: CLINIC | Age: 37
End: 2022-11-22

## 2022-11-22 VITALS
DIASTOLIC BLOOD PRESSURE: 80 MMHG | WEIGHT: 276.3 LBS | SYSTOLIC BLOOD PRESSURE: 122 MMHG | HEART RATE: 79 BPM | RESPIRATION RATE: 16 BRPM | HEIGHT: 69 IN | BODY MASS INDEX: 40.92 KG/M2

## 2022-11-22 DIAGNOSIS — H91.92 DECREASED HEARING OF LEFT EAR: Primary | ICD-10-CM

## 2022-11-22 RX ORDER — DIPHENOXYLATE HYDROCHLORIDE AND ATROPINE SULFATE 2.5; .025 MG/1; MG/1
1 TABLET ORAL DAILY
COMMUNITY

## 2022-11-22 RX ORDER — MELATONIN
1000 DAILY
COMMUNITY

## 2022-11-22 RX ORDER — VITAMIN B COMPLEX
1 CAPSULE ORAL DAILY
COMMUNITY

## 2022-11-22 NOTE — PROGRESS NOTES
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  The following portions of the patient's history were reviewed and updated as appropriate: allergies, current medications, past family history, past medical history, past social history, past surgical history and problem list     Past Medical History:   Diagnosis Date   • Hypertension    • Morbid obesity ([address] )      Past Surgical History:   Procedure Laterality Date   • NO PAST SURGERIES       Family History   Problem Relation Age of Onset   • No Known Problems Mother    • Cancer Father    • Alcohol abuse Father         alcoholism   • Mental illness Neg Hx    • Substance Abuse Neg Hx      Social History     Socioeconomic History   • Marital status: Single     Spouse name: Not on file   • Number of children: Not on file   • Years of education: Not on file   • Highest education level: Not on file   Occupational History   • Not on file   Tobacco Use   • Smoking status: Never   • Smokeless tobacco: Never   Vaping Use   • Vaping Use: Never used   Substance and Sexual Activity   • Alcohol use: No   • Drug use: No     Comment: uses marijuana (as per allscripts)   • Sexual activity: Yes     Partners: Male     Birth control/protection: I U D     Other Topics Concern   • Not on file   Social History Narrative    Always uses seat belt    Daily caffeinated coffee consumption-3-4 cups coffee or tea daily    Denied: history of has smoke detectors     Social Determinants of Health     Financial Resource Strain: Not on file   Food Insecurity: Not on file   Transportation Needs: Not on file   Physical Activity: Not on file   Stress: Not on file   Social Connections: Not on file   Intimate Partner Violence: Not on file   Housing Stability: Not on file       Current Outpatient Medications:   •  b complex vitamins capsule, Take 1 capsule by mouth daily, Disp: , Rfl:   •  BIOTIN PO, Take by mouth in the morning, Disp: , Rfl:   •  cholecalciferol (VITAMIN D3) 1,000 units tablet, Take 1,000 Units by mouth daily, Disp: , Rfl:   •  levonorgestrel (MIRENA) 20 MCG/24HR IUD, by Intrauterine route, Disp: , Rfl:   •  multivitamin (THERAGRAN) TABS, Take 1 tablet by mouth daily, Disp: , Rfl:   •  acyclovir (ZOVIRAX) 400 MG tablet, Take 1 tablet (400 mg total) by mouth 3 (three) times a day for 7 days, Disp: 21 tablet, Rfl: 0    Review of Systems          Objective:    Vitals:    11/22/22 1654   BP: 122/80   Pulse: 79   Resp: 16   Weight: 125 kg (276 lb 4 8 oz)   Height: 5' 8 75" (1 746 m)        Physical Exam  Constitutional:       Appearance: Normal appearance  She is well-developed and well-nourished  HENT:      Head: Normocephalic and atraumatic  Right Ear: Tympanic membrane, ear canal and external ear normal       Left Ear: Tympanic membrane, ear canal and external ear normal       Nose: Nose normal       Mouth/Throat:      Mouth: Mucous membranes are moist       Pharynx: Oropharynx is clear  Cardiovascular:      Rate and Rhythm: Normal rate and regular rhythm  Pulses: Normal pulses  Heart sounds: Normal heart sounds  Pulmonary:      Effort: Pulmonary effort is normal       Breath sounds: Normal breath sounds  Musculoskeletal:      Cervical back: Normal range of motion  Lymphadenopathy:      Cervical: No cervical adenopathy  Skin:     General: Skin is warm     Neurological:      General: No focal deficit present  Mental Status: She is alert and oriented to person, place, and time  Psychiatric:         Mood and Affect: Mood and affect and mood normal          Behavior: Behavior normal          Thought Content: Thought content normal          Judgment: Judgment normal        BMI Counseling: Body mass index is 41 1 kg/m²  The BMI is above normal  Nutrition recommendations include reducing portion sizes

## 2022-12-05 ENCOUNTER — OFFICE VISIT (OUTPATIENT)
Dept: FAMILY MEDICINE CLINIC | Facility: CLINIC | Age: 37
End: 2022-12-05

## 2022-12-05 VITALS
RESPIRATION RATE: 16 BRPM | BODY MASS INDEX: 41.26 KG/M2 | OXYGEN SATURATION: 99 % | WEIGHT: 278.6 LBS | SYSTOLIC BLOOD PRESSURE: 144 MMHG | DIASTOLIC BLOOD PRESSURE: 98 MMHG | HEART RATE: 84 BPM | HEIGHT: 69 IN | TEMPERATURE: 98.5 F

## 2022-12-05 DIAGNOSIS — J02.0 STREP PHARYNGITIS: Primary | ICD-10-CM

## 2022-12-05 DIAGNOSIS — J02.9 SORE THROAT: ICD-10-CM

## 2022-12-05 LAB — S PYO AG THROAT QL: POSITIVE

## 2022-12-05 RX ORDER — PENICILLIN V POTASSIUM 500 MG/1
500 TABLET ORAL 2 TIMES DAILY
Qty: 20 TABLET | Refills: 0 | Status: SHIPPED | OUTPATIENT
Start: 2022-12-05 | End: 2022-12-15

## 2022-12-05 NOTE — PROGRESS NOTES
Assessment/Plan:     Diagnoses and all orders for this visit:    Strep pharyngitis  -     penicillin V potassium (VEETID) 500 mg tablet; Take 1 tablet (500 mg total) by mouth 2 (two) times a day for 10 days      Pen VK course ordered  Medication and s/e reviewed  Pt to rest and keep well hydrated  Warm salt water gargles encouraged  Contagious period reviewed  Patient is encouraged to call our office for any questions/concerns, persistent or worsening symptoms  Patient states they understand and agree with treatment plan  Sore throat  -     POCT rapid strepA          Pt to f/u PRN  Subjective:      Patient ID: Martha Liz is a 40 y o  female  Pt presents today for sore throat, nasal congestion that started last week  She denies fever, chills, body aches, cough  The following portions of the patient's history were reviewed and updated as appropriate: allergies, current medications, past family history, past medical history, past social history and problem list     Review of Systems    As noted per HPI  Objective:      /98 (BP Location: Left arm, Patient Position: Sitting, Cuff Size: Large)   Pulse 84   Temp 98 5 °F (36 9 °C) (Tympanic)   Resp 16   Ht 5' 8 5" (1 74 m)   Wt 126 kg (278 lb 9 6 oz)   SpO2 99%   BMI 41 74 kg/m²          Physical Exam  Vitals reviewed  Constitutional:       General: She is not in acute distress  Appearance: Normal appearance  She is not ill-appearing  HENT:      Head: Normocephalic  Right Ear: Tympanic membrane, ear canal and external ear normal       Left Ear: Tympanic membrane, ear canal and external ear normal       Nose: No congestion or rhinorrhea  Mouth/Throat:      Pharynx: Posterior oropharyngeal erythema present  No oropharyngeal exudate  Cardiovascular:      Rate and Rhythm: Normal rate and regular rhythm  Pulses: Normal pulses  Heart sounds: Normal heart sounds  No murmur heard    Pulmonary:      Effort: Pulmonary effort is normal  No respiratory distress  Breath sounds: Normal breath sounds  No wheezing  Neurological:      Mental Status: She is alert and oriented to person, place, and time  Mental status is at baseline     Psychiatric:         Mood and Affect: Mood normal          Behavior: Behavior normal

## 2022-12-20 ENCOUNTER — OFFICE VISIT (OUTPATIENT)
Dept: FAMILY MEDICINE CLINIC | Facility: CLINIC | Age: 37
End: 2022-12-20

## 2022-12-20 VITALS
OXYGEN SATURATION: 98 % | BODY MASS INDEX: 40.7 KG/M2 | HEIGHT: 69 IN | RESPIRATION RATE: 16 BRPM | TEMPERATURE: 98.9 F | WEIGHT: 274.8 LBS | SYSTOLIC BLOOD PRESSURE: 128 MMHG | HEART RATE: 85 BPM | DIASTOLIC BLOOD PRESSURE: 82 MMHG

## 2022-12-20 DIAGNOSIS — R05.1 ACUTE COUGH: Primary | ICD-10-CM

## 2022-12-20 RX ORDER — BENZONATATE 200 MG/1
200 CAPSULE ORAL 3 TIMES DAILY PRN
Qty: 20 CAPSULE | Refills: 0 | Status: SHIPPED | OUTPATIENT
Start: 2022-12-20

## 2022-12-20 RX ORDER — ALBUTEROL SULFATE 90 UG/1
2 AEROSOL, METERED RESPIRATORY (INHALATION) EVERY 6 HOURS PRN
Qty: 18 G | Refills: 5 | Status: SHIPPED | OUTPATIENT
Start: 2022-12-20

## 2022-12-20 NOTE — PROGRESS NOTES
Assessment/Plan:    1  Cough - viral in nature, will treat with tessalon perles ald albuterol prn, fluids and rest    F/u as needed    Subjective:   Chief Complaint   Patient presents with   • Cough     Times 3 days       Patient ID: Rachael Montejo is a 40 y o  female  Patient "hard cough"  Initially Sunday felt a rhaspy feeling in chest with starting has stopped  Denies fever, chills, sob, wheeze, sore throat, nasal congestion, ear pain  Tried cough suppressant without relief  The following portions of the patient's history were reviewed and updated as appropriate: allergies, current medications, past family history, past medical history, past social history, past surgical history and problem list     Past Medical History:   Diagnosis Date   • Hypertension    • Morbid obesity ([facility] [address] )      Past Surgical History:   Procedure Laterality Date   • NO PAST SURGERIES       Family History   Problem Relation Age of Onset   • No Known Problems Mother    • Cancer Father    • Alcohol abuse Father         alcoholism   • Mental illness Neg Hx    • Substance Abuse Neg Hx      Social History     Socioeconomic History   • Marital status: Single     Spouse name: Not on file   • Number of children: Not on file   • Years of education: Not on file   • Highest education level: Not on file   Occupational History   • Not on file   Tobacco Use   • Smoking status: Never   • Smokeless tobacco: Never   Vaping Use   • Vaping Use: Never used   Substance and Sexual Activity   • Alcohol use: No   • Drug use: No     Comment: uses marijuana (as per allscripts)   • Sexual activity: Yes     Partners: Male     Birth control/protection: I U D     Other Topics Concern   • Not on file   Social History Narrative    Always uses seat belt    Daily caffeinated coffee consumption-3-4 cups coffee or tea daily    Denied: history of has smoke detectors     Social Determinants of Health     Financial Resource Strain: Not on file   Food Insecurity: Not on file Transportation Needs: Not on file   Physical Activity: Not on file   Stress: Not on file   Social Connections: Not on file   Intimate Partner Violence: Not on file   Housing Stability: Not on file       Current Outpatient Medications:   •  b complex vitamins capsule, Take 1 capsule by mouth daily, Disp: , Rfl:   •  BIOTIN PO, Take by mouth in the morning, Disp: , Rfl:   •  cholecalciferol (VITAMIN D3) 1,000 units tablet, Take 1,000 Units by mouth daily, Disp: , Rfl:   •  levonorgestrel (MIRENA) 20 MCG/24HR IUD, by Intrauterine route, Disp: , Rfl:   •  multivitamin (THERAGRAN) TABS, Take 1 tablet by mouth daily, Disp: , Rfl:     Review of Systems   Constitutional: Negative for chills and fever  HENT: Negative for congestion, ear pain, postnasal drip, rhinorrhea, sinus pressure, sinus pain and sore throat  Eyes: Negative for pain and visual disturbance  Respiratory: Positive for cough  Negative for shortness of breath and wheezing  Cardiovascular: Negative for chest pain and palpitations  Gastrointestinal: Negative for abdominal pain and vomiting  Genitourinary: Negative for dysuria and hematuria  Musculoskeletal: Negative for arthralgias and back pain  Skin: Negative for color change and rash  Neurological: Negative for seizures and syncope  All other systems reviewed and are negative  Objective:    Vitals:    12/20/22 1214   BP: 128/82   Pulse: 85   Resp: 16   Temp: 98 9 °F (37 2 °C)   SpO2: 98%   Weight: 125 kg (274 lb 12 8 oz)   Height: 5' 8 5" (1 74 m)        Physical Exam  Constitutional:       Appearance: Normal appearance  HENT:      Head: Normocephalic and atraumatic  Right Ear: Tympanic membrane, ear canal and external ear normal       Left Ear: Tympanic membrane, ear canal and external ear normal       Nose: Nose normal       Mouth/Throat:      Mouth: Mucous membranes are moist       Pharynx: Oropharynx is clear     Cardiovascular:      Rate and Rhythm: Normal rate and regular rhythm  Pulses: Normal pulses  Heart sounds: Normal heart sounds  Pulmonary:      Effort: Pulmonary effort is normal  No respiratory distress  Breath sounds: Normal breath sounds  No wheezing, rhonchi or rales  Abdominal:      General: Abdomen is flat  Palpations: Abdomen is soft  Musculoskeletal:      Cervical back: Normal range of motion and neck supple  Lymphadenopathy:      Cervical: No cervical adenopathy  Neurological:      General: No focal deficit present  Mental Status: She is alert and oriented to person, place, and time  Psychiatric:         Mood and Affect: Mood normal          Behavior: Behavior normal          Thought Content:  Thought content normal          Judgment: Judgment normal

## 2023-10-04 ENCOUNTER — OFFICE VISIT (OUTPATIENT)
Dept: URGENT CARE | Facility: CLINIC | Age: 38
End: 2023-10-04
Payer: COMMERCIAL

## 2023-10-04 VITALS
OXYGEN SATURATION: 99 % | TEMPERATURE: 98.3 F | SYSTOLIC BLOOD PRESSURE: 130 MMHG | DIASTOLIC BLOOD PRESSURE: 84 MMHG | RESPIRATION RATE: 18 BRPM | HEART RATE: 85 BPM

## 2023-10-04 DIAGNOSIS — J02.9 SORE THROAT: ICD-10-CM

## 2023-10-04 DIAGNOSIS — J02.0 STREP PHARYNGITIS: Primary | ICD-10-CM

## 2023-10-04 LAB — S PYO AG THROAT QL: NEGATIVE

## 2023-10-04 PROCEDURE — 99213 OFFICE O/P EST LOW 20 MIN: CPT | Performed by: FAMILY MEDICINE

## 2023-10-04 PROCEDURE — 87880 STREP A ASSAY W/OPTIC: CPT | Performed by: FAMILY MEDICINE

## 2023-10-04 RX ORDER — METHYLPREDNISOLONE 4 MG/1
TABLET ORAL
Qty: 21 TABLET | Refills: 0 | Status: SHIPPED | OUTPATIENT
Start: 2023-10-04

## 2023-10-04 RX ORDER — AMOXICILLIN 500 MG/1
500 CAPSULE ORAL EVERY 12 HOURS SCHEDULED
Qty: 20 CAPSULE | Refills: 0 | Status: SHIPPED | OUTPATIENT
Start: 2023-10-04 | End: 2023-10-14

## 2023-10-04 NOTE — PROGRESS NOTES
[facility]        NAME: Edman Spurling is a 40 y.o. female  : 1985    MRN: [id number]  DATE: 2023  TIME: 5:14 PM    Assessment and Plan   Strep pharyngitis [J02.0]  1. Strep pharyngitis  methylPREDNISolone 4 MG tablet therapy pack    amoxicillin (AMOXIL) 500 mg capsule      2. Sore throat  POCT rapid strepA            Patient Instructions       Follow up with PCP in 3-5 days. Proceed to  ER if symptoms worsen. Chief Complaint     Chief Complaint   Patient presents with    Sore Throat     Patient states symptoms started yesterday. Today worse than yesterday, headache and she felt like she had a fever but didn't take temp. Test negative for COVID last night         History of Present Illness       54-year-old female with 3-day history of sore throat and painful swallowing. Review of Systems   Review of Systems   Constitutional: Negative. HENT:  Positive for sore throat, trouble swallowing and voice change. Eyes: Negative. Respiratory: Negative. Cardiovascular: Negative. Gastrointestinal: Negative. Endocrine: Negative. Genitourinary: Negative. Musculoskeletal: Negative. Skin: Negative. Allergic/Immunologic: Negative. Neurological: Negative. Hematological: Negative. Psychiatric/Behavioral: Negative.            Current Medications       Current Outpatient Medications:     amoxicillin (AMOXIL) 500 mg capsule, Take 1 capsule (500 mg total) by mouth every 12 (twelve) hours for 10 days, Disp: 20 capsule, Rfl: 0    methylPREDNISolone 4 MG tablet therapy pack, Use as directed on package, Disp: 21 tablet, Rfl: 0    albuterol (Ventolin HFA) 90 mcg/act inhaler, Inhale 2 puffs every 6 (six) hours as needed for wheezing, Disp: 18 g, Rfl: 5    b complex vitamins capsule, Take 1 capsule by mouth daily, Disp: , Rfl:     benzonatate (TESSALON) 200 MG capsule, Take 1 capsule (200 mg total) by mouth 3 (three) times a day as needed for cough, Disp: 20 capsule, Rfl: 0    BIOTIN PO, Take by mouth in the morning, Disp: , Rfl:     cholecalciferol (VITAMIN D3) 1,000 units tablet, Take 1,000 Units by mouth daily, Disp: , Rfl:     levonorgestrel (MIRENA) 20 MCG/24HR IUD, by Intrauterine route, Disp: , Rfl:     multivitamin (THERAGRAN) TABS, Take 1 tablet by mouth daily, Disp: , Rfl:     Current Allergies     Allergies as of 10/04/2023 - Reviewed 10/04/2023   Allergen Reaction Noted    Dust mite extract Allergic Rhinitis 06/14/2016    Latex Rash 04/12/2012            The following portions of the patient's history were reviewed and updated as appropriate: allergies, current medications, past family history, past medical history, past social history, past surgical history and problem list.     Past Medical History:   Diagnosis Date    Hypertension     Morbid obesity ([address])        Past Surgical History:   Procedure Laterality Date    NO PAST SURGERIES         Family History   Problem Relation Age of Onset    No Known Problems Mother     Cancer Father     Alcohol abuse Father         alcoholism    Mental illness Neg Hx     Substance Abuse Neg Hx          Medications have been verified. Objective   /84   Pulse 85   Temp 98.3 °F (36.8 °C) (Tympanic)   Resp 18   SpO2 99%   No LMP recorded. Patient has had an implant. Physical Exam     Physical Exam  Vitals and nursing note reviewed. Constitutional:       Appearance: She is well-developed. HENT:      Head: Normocephalic. Nose: Nose normal.      Mouth/Throat:      Pharynx: Oropharyngeal exudate and posterior oropharyngeal erythema present. Eyes:      Pupils: Pupils are equal, round, and reactive to light. Cardiovascular:      Rate and Rhythm: Normal rate. Pulmonary:      Effort: Pulmonary effort is normal.   Abdominal:      General: Abdomen is flat. Musculoskeletal:         General: Normal range of motion. Cervical back: Normal range of motion. Skin:     General: Skin is warm and dry. Neurological:      Mental Status: She is alert and oriented to person, place, and time.

## 2023-10-10 ENCOUNTER — AMB VIDEO VISIT (OUTPATIENT)
Dept: OTHER | Facility: HOSPITAL | Age: 38
End: 2023-10-10

## 2023-10-10 DIAGNOSIS — J02.9 PHARYNGITIS, UNSPECIFIED ETIOLOGY: Primary | ICD-10-CM

## 2023-10-10 DIAGNOSIS — J02.9 PHARYNGITIS, UNSPECIFIED ETIOLOGY: ICD-10-CM

## 2023-10-10 PROCEDURE — 87070 CULTURE OTHR SPECIMN AEROBIC: CPT | Performed by: PHYSICIAN ASSISTANT

## 2023-10-10 NOTE — PATIENT INSTRUCTIONS
Take ibuprofen 600 mg every 6 hours  Alternate with tylenol 500-650 mg every 6 hours for more constant pain relief  Ex. Ibuprofen 9 am, tylenol 12 pm, ibuprofen 3 pm, tylenol 6 pm, etc.    Warm salt water gargles, warm tea with honey as needed    Chloraseptic spray or throat lozenges with benzocaine (cepacol max strength). Go to the emergency department if you have worsening swelling, difficulty swallowing due to swelling, or difficulty breathing. Please schedule follow-up appointment with your primary care physician within the next 1-2 business days for recheck.

## 2023-10-10 NOTE — PROGRESS NOTES
Video Visit - Marcus Mascorro 40 y.o. female MRN: [id number]    REQUIRED DOCUMENTATION:         1. This service was provided via [facility]. 2. Provider located at [address] [place] [id number] [phone].  3. [facility] provider: Krzysztof Fall PA-C.  4. Identify all parties in room with patient during [facility] visit:  No one else  5. After connecting through televideo, patient was identified by name and date of birth. Patient was then informed that this was a Telemedicine visit and that the exam was being conducted confidentially over secure lines. My office door was closed. No one else was in the room. Patient acknowledged consent and understanding of privacy and security of the Telemedicine visit. I informed the patient that I have reviewed their record in Epic and presented the opportunity for them to ask any questions regarding the visit today. The patient agreed to participate. VITALS: Heart Rate: 80 BPM, Respiratory Rate: 12 RPM, Temperature Unavailable° F, Blood Pressure Unavailable mmHg, Pulse Ox Unavailable % on RA    HPI  Pt was seen at , strep was neg, so was told to stop amox. Finished medrol dose jaqcuelyn which was helping. Still feels like throat is swollen at times, but no pain. No known exacerbating factors. Cepacol helps some. Reports energy level is normal. Denies fevers. COVID negative. No trouble swallowing or breathing. NO concern for STI. She is a llanos  Physical Exam  Constitutional:       General: She is not in acute distress. Appearance: Normal appearance. She is not toxic-appearing. HENT:      Head: Normocephalic and atraumatic. Nose: No rhinorrhea. Mouth/Throat:      Mouth: Mucous membranes are moist.      Pharynx: Uvula midline. Uvula swelling (some caudally) present. No posterior oropharyngeal erythema. Tonsils: No tonsillar exudate or tonsillar abscesses. 2+ on the right.       Comments: Unable to visualize L tonsil  Eyes:      Conjunctiva/sclera: Conjunctivae normal.   Cardiovascular:      Rate and Rhythm: Normal rate. Pulmonary:      Effort: Pulmonary effort is normal. No respiratory distress. Breath sounds: No wheezing (no gross audible wheeze through computer). Musculoskeletal:      Cervical back: Normal range of motion. Lymphadenopathy:      Cervical: No cervical adenopathy. Skin:     Findings: No rash (on face or neck). Neurological:      Mental Status: She is alert. Cranial Nerves: No dysarthria or facial asymmetry. Psychiatric:         Mood and Affect: Mood normal.         Behavior: Behavior normal.         Diagnoses and all orders for this visit:    Pharyngitis, unspecified etiology  -     Throat culture; Future      Patient Instructions   Take ibuprofen 600 mg every 6 hours  Alternate with tylenol 500-650 mg every 6 hours for more constant pain relief  Ex. Ibuprofen 9 am, tylenol 12 pm, ibuprofen 3 pm, tylenol 6 pm, etc.    Warm salt water gargles, warm tea with honey as needed    Chloraseptic spray or throat lozenges with benzocaine (cepacol max strength). Go to the emergency department if you have worsening swelling, difficulty swallowing due to swelling, or difficulty breathing. Please schedule follow-up appointment with your primary care physician within the next 1-2 business days for recheck.

## 2023-10-10 NOTE — CARE ANYWHERE EVISITS
Visit Summary for [facility] Kristie Snyder - Gender: Female - Date of Birth: [id number]  Date: [id number] - Duration: 14 minutes  Patient: [facility] . Claudio  Provider: Rosemary Ziegler PA-C    Patient Contact Information  Address  [address] [place] [address]; [address]  [phone]    Visit Topics  Flu-Like Symptoms [Added By: Self - 2023-10-10]    Triage Questions   What is your current physical address in the event of a medical emergency? Answer []  Are you allergic to any medications? Answer []  Are you now or could you be pregnant? Answer []  Do you have any immune system compromise or chronic lung   disease? Answer []  Do you have any vulnerable family members in the home (infant, pregnant, cancer, elderly)? Answer []     Conversation Transcripts  [0A][0A] [Notification] You are connected with Rosemary Ziegler PA-C, Urgent Care Specialist.[0A][Notification] [facility] is located in [address] [place] [address],[address]. [0A][Notification] [facility] H[facility]josa has shared health history. Janeen Call .[0A]    Diagnosis  Acute pharyngitis    Procedures  Value: [id number] Code: CPT-4 UNLISTED E&M SERVICE    Medications Prescribed    No prescriptions ordered    Electronically signed by: Maris Min(NPI [id number])

## 2023-10-12 LAB — BACTERIA THROAT CULT: NORMAL

## 2023-10-13 ENCOUNTER — OFFICE VISIT (OUTPATIENT)
Dept: FAMILY MEDICINE CLINIC | Facility: CLINIC | Age: 38
End: 2023-10-13

## 2023-10-13 VITALS
DIASTOLIC BLOOD PRESSURE: 98 MMHG | HEART RATE: 86 BPM | BODY MASS INDEX: 43.4 KG/M2 | WEIGHT: 293 LBS | TEMPERATURE: 97.8 F | HEIGHT: 69 IN | OXYGEN SATURATION: 98 % | RESPIRATION RATE: 16 BRPM | SYSTOLIC BLOOD PRESSURE: 136 MMHG

## 2023-10-13 DIAGNOSIS — K21.9 GASTROESOPHAGEAL REFLUX DISEASE WITHOUT ESOPHAGITIS: Primary | ICD-10-CM

## 2023-10-13 PROCEDURE — 99214 OFFICE O/P EST MOD 30 MIN: CPT | Performed by: PHYSICIAN ASSISTANT

## 2023-10-13 RX ORDER — CHLORAL HYDRATE 500 MG
1000 CAPSULE ORAL DAILY
COMMUNITY

## 2023-10-13 RX ORDER — OMEPRAZOLE 20 MG/1
20 CAPSULE, DELAYED RELEASE ORAL DAILY
Qty: 14 CAPSULE | Refills: 0 | Status: SHIPPED | OUTPATIENT
Start: 2023-10-13 | End: 2023-10-27

## 2023-10-13 NOTE — PROGRESS NOTES
Assessment/Plan:    Reflux - most likely due to prednisone on an empty stomach, start Prilosec once daily on an empty stomach, limit acid foods. 2. Viral uri - reviewed urgent care and video visit today, reviewed both throat cultures, reassurance resolving, fluids, rest, nasal lavage    F/u as needed      Subjective:   Chief Complaint   Patient presents with    Sore Throat     Seen at urgent care on 10/4  Throat culture negative and COVID negative  Finished steroid pack   Lingering soreness in throat and feels swollen  Comes and goes  Some tingling of tongue    Nose Problem     Feels bump or fullness in left nostril      Patient ID: Katja Reyes is a 40 y.o. female. Patient seen 10/4/2023 for sore throat, was dx with strep and put on methylprednisolone and amoxil. Stuffy nose, sore throat and achy. Took 2 days of amoxil and culture came back negative and was told to stop. Felt better and then started again but now feels metallically test, comes and goes.         The following portions of the patient's history were reviewed and updated as appropriate: allergies, current medications, past family history, past medical history, past social history, past surgical history, and problem list.    Past Medical History:   Diagnosis Date    Depression with anxiety 3/3/2016    HTN (hypertension) 2/4/2016    Transitioned From: Blood pressure elevated    Hypertension     Morbid obesity ([address])      Past Surgical History:   Procedure Laterality Date    NO PAST SURGERIES       Family History   Problem Relation Age of Onset    No Known Problems Mother     Cancer Father     Alcohol abuse Father         alcoholism    Mental illness Neg Hx     Substance Abuse Neg Hx      Social History     Socioeconomic History    Marital status: Single     Spouse name: Not on file    Number of children: Not on file    Years of education: Not on file    Highest education level: Not on file   Occupational History    Not on file   Tobacco Use Smoking status: Never    Smokeless tobacco: Never   Vaping Use    Vaping Use: Never used   Substance and Sexual Activity    Alcohol use: No    Drug use: No     Comment: uses marijuana (as per allscripts)    Sexual activity: Yes     Partners: Male     Birth control/protection: I.U.D.    Other Topics Concern    Not on file   Social History Narrative    Always uses seat belt    Daily caffeinated coffee consumption-3-4 cups coffee or tea daily    Denied: history of has smoke detectors     Social Determinants of Health     Financial Resource Strain: Not on file   Food Insecurity: Not on file   Transportation Needs: Not on file   Physical Activity: Not on file   Stress: Not on file   Social Connections: Not on file   Intimate Partner Violence: Not on file   Housing Stability: Not on file       Current Outpatient Medications:     b complex vitamins capsule, Take 1 capsule by mouth daily B6 100 mg daily, Disp: , Rfl:     BIOTIN PO, Take by mouth in the morning Hair skin and nails, Disp: , Rfl:     cholecalciferol (VITAMIN D3) 1,000 units tablet, Take 1,000 Units by mouth daily 25 mcg, Disp: , Rfl:     levonorgestrel (MIRENA) 20 MCG/24HR IUD, by Intrauterine route, Disp: , Rfl:     multivitamin (THERAGRAN) TABS, Take 1 tablet by mouth daily, Disp: , Rfl:     Omega-3 Fatty Acids (fish oil) 1,000 mg, Take 1,000 mg by mouth daily, Disp: , Rfl:     amoxicillin (AMOXIL) 500 mg capsule, Take 1 capsule (500 mg total) by mouth every 12 (twelve) hours for 10 days (Patient not taking: Reported on 10/13/2023), Disp: 20 capsule, Rfl: 0    methylPREDNISolone 4 MG tablet therapy pack, Use as directed on package (Patient not taking: Reported on 10/13/2023), Disp: 21 tablet, Rfl: 0    Review of Systems          Objective:    Vitals:    10/13/23 1259   BP: 136/98   Pulse: 86   Resp: 16   Temp: 97.8 °F (36.6 °C)   TempSrc: Temporal   SpO2: 98%   Weight: (!) 138 kg (304 lb)   Height: 5' 8.5" (1.74 m)        Physical Exam  Constitutional: Appearance: She is well-developed. HENT:      Head: Normocephalic and atraumatic. Right Ear: Tympanic membrane and ear canal normal.      Left Ear: Tympanic membrane and ear canal normal.      Nose: Congestion and rhinorrhea present. Mouth/Throat:      Mouth: Mucous membranes are moist.      Pharynx: Oropharynx is clear. No oropharyngeal exudate or posterior oropharyngeal erythema. Tonsils: No tonsillar exudate. 1+ on the right. 1+ on the left. Eyes:      Conjunctiva/sclera: Conjunctivae normal.   Cardiovascular:      Rate and Rhythm: Normal rate and regular rhythm. Heart sounds: Normal heart sounds. Pulmonary:      Effort: Pulmonary effort is normal.      Breath sounds: Normal breath sounds. Musculoskeletal:      Cervical back: Normal range of motion and neck supple. Lymphadenopathy:      Cervical: No cervical adenopathy. Skin:     General: Skin is warm. Neurological:      General: No focal deficit present. Mental Status: She is alert and oriented to person, place, and time.    Psychiatric:         Mood and Affect: Mood normal.         Behavior: Behavior normal.

## 2023-10-18 ENCOUNTER — TELEPHONE (OUTPATIENT)
Dept: FAMILY MEDICINE CLINIC | Facility: CLINIC | Age: 38
End: 2023-10-18

## 2023-10-18 NOTE — TELEPHONE ENCOUNTER
Patient called asking for a note for her last visit with you, which was 10/13/23. She was recently written up at work and asked for a note stating that she was here for the same ongoing issue. She wanted some kind of documentation for her work. She has MyChart and would be able to print out the letter from there.

## 2023-11-09 ENCOUNTER — ANNUAL EXAM (OUTPATIENT)
Dept: OBGYN CLINIC | Facility: CLINIC | Age: 38
End: 2023-11-09
Payer: COMMERCIAL

## 2023-11-09 VITALS
HEIGHT: 68 IN | WEIGHT: 293 LBS | BODY MASS INDEX: 44.41 KG/M2 | SYSTOLIC BLOOD PRESSURE: 148 MMHG | DIASTOLIC BLOOD PRESSURE: 96 MMHG

## 2023-11-09 DIAGNOSIS — Z12.4 ENCOUNTER FOR PAPANICOLAOU SMEAR FOR CERVICAL CANCER SCREENING: ICD-10-CM

## 2023-11-09 DIAGNOSIS — Z01.419 ENCOUNTER FOR GYNECOLOGICAL EXAMINATION WITHOUT ABNORMAL FINDING: Primary | ICD-10-CM

## 2023-11-09 DIAGNOSIS — Z97.5 IUD (INTRAUTERINE DEVICE) IN PLACE: ICD-10-CM

## 2023-11-09 DIAGNOSIS — Z11.3 SCREEN FOR STD (SEXUALLY TRANSMITTED DISEASE): ICD-10-CM

## 2023-11-09 DIAGNOSIS — L70.0 CLOSED COMEDONE: ICD-10-CM

## 2023-11-09 PROCEDURE — 99395 PREV VISIT EST AGE 18-39: CPT | Performed by: NURSE PRACTITIONER

## 2023-11-09 NOTE — PROGRESS NOTES
Assessment/Plan:    Pap every 5 years if normal, sexually transmitted infection testing as indicated, exercise most days of week, obtain appropriate diet and hydration, Calcium 1000mg + 600 vit D daily, birth control as directed [place] inserted 1/2/2020 . Annual mammogram starting at age 36, monthly breast self exam  Warm compress 2-3 times per day to groin Call if worsens      Diagnoses and all orders for this visit:    Encounter for gynecological examination without abnormal finding  -     Thinprep Tis Pap Reflex HPV mRNA E6/E7, Chlamydia/N.gonorrhoeae    Encounter for Papanicolaou smear for cervical cancer screening  -     Thinprep Tis Pap Reflex HPV mRNA E6/E7, Chlamydia/N.gonorrhoeae    IUD (intrauterine device) in place    Screen for STD (sexually transmitted disease)  -     Thinprep Tis Pap Reflex HPV mRNA E6/E7, Chlamydia/N.gonorrhoeae    Closed comedone    Other orders  -     Liquid-based pap, screening          Subjective:      Patient ID: Maria C Biggs is a 45 y.o. female. Here for annual gyn Has London inserted 1/2/2020 rare spotting No unusual discharge  Denies abd or pelvic pain  PAP 4/2019 neg/neg No h/o abn pap 3 new partners since seen last Noticed lump right groin crease tender if squeezed Has had other cysts in the past         The following portions of the patient's history were reviewed and updated as appropriate: allergies, current medications, past family history, past medical history, past social history, past surgical history, and problem list.    Review of Systems   Constitutional:  Negative for fatigue and unexpected weight change. Gastrointestinal:  Negative for abdominal distention, abdominal pain, constipation and diarrhea. Genitourinary:  Negative for difficulty urinating, dyspareunia, dysuria, frequency, genital sores, menstrual problem, pelvic pain, urgency, vaginal bleeding, vaginal discharge and vaginal pain. Neurological:  Negative for headaches. Psychiatric/Behavioral: Negative. Negative for dysphoric mood. The patient is not nervous/anxious. Objective:      /96 (BP Location: Left arm, Patient Position: Sitting, Cuff Size: Large)   Ht 5' 8" (1.727 m)   Wt (!) 139 kg (307 lb)   BMI 46.68 kg/m²          Physical Exam  Vitals and nursing note reviewed. Constitutional:       General: She is not in acute distress. Appearance: Normal appearance. HENT:      Head: Normocephalic and atraumatic. Pulmonary:      Effort: Pulmonary effort is normal.   Chest:   Breasts:     Breasts are symmetrical.      Right: Normal. No mass, nipple discharge, skin change or tenderness. Left: Normal. No mass, nipple discharge, skin change or tenderness. Abdominal:      General: There is no distension. Palpations: Abdomen is soft. Tenderness: There is no abdominal tenderness. There is no guarding or rebound. Genitourinary:     General: Normal vulva. Exam position: Lithotomy position. Labia:         Right: No rash, tenderness, lesion or injury. Left: No rash, tenderness, lesion or injury. Urethra: No prolapse, urethral pain, urethral swelling or urethral lesion. Vagina: Normal. No erythema or lesions. Cervix: No cervical motion tenderness, discharge, lesion or cervical bleeding. Uterus: Normal.       Adnexa: Right adnexa normal and left adnexa normal.        Right: No mass or tenderness. Left: No mass or tenderness. Rectum: No mass or external hemorrhoid. Comments: IUD strings noted in OS. PAP from cervix   Musculoskeletal:         General: Normal range of motion. Lymphadenopathy:      Upper Body:      Right upper body: No axillary adenopathy. Left upper body: No axillary adenopathy. Lower Body: No right inguinal adenopathy. No left inguinal adenopathy. Skin:     General: Skin is warm and dry.    Neurological:      Mental Status: She is alert and oriented to person, place, and time. Psychiatric:         Mood and Affect: Mood normal.         Behavior: Behavior normal.         Thought Content:  Thought content normal.         Judgment: Judgment normal.

## 2023-11-09 NOTE — PATIENT INSTRUCTIONS
Pap every 5 years if normal, sexually transmitted infection testing as indicated, exercise most days of week, obtain appropriate diet and hydration, Calcium 1000mg + 600 vit D daily, birth control as directed [place] queen 1/2/2020 .   Annual mammogram starting at age 36, monthly breast self exam  Warm compress 2-3 times per day to groin Call if worsens

## 2023-11-10 LAB
C TRACH RRNA SPEC QL NAA+PROBE: NOT DETECTED
N GONORRHOEA RRNA SPEC QL NAA+PROBE: NOT DETECTED

## 2023-11-16 LAB
C TRACH RRNA SPEC QL NAA+PROBE: NOT DETECTED
CLINICAL INFO: NORMAL
CYTO CVX: NORMAL
CYTOLOGY CMNT CVX/VAG CYTO-IMP: NORMAL
DATE PREVIOUS BX: NORMAL
LMP START DATE: NORMAL
N GONORRHOEA RRNA SPEC QL NAA+PROBE: NOT DETECTED
SL AMB PREV. PAP:: NORMAL
SPECIMEN SOURCE CVX/VAG CYTO: NORMAL

## 2023-12-11 ENCOUNTER — PATIENT MESSAGE (OUTPATIENT)
Dept: FAMILY MEDICINE CLINIC | Facility: CLINIC | Age: 38
End: 2023-12-11

## 2023-12-13 ENCOUNTER — TELEPHONE (OUTPATIENT)
Dept: FAMILY MEDICINE CLINIC | Facility: CLINIC | Age: 38
End: 2023-12-13

## 2023-12-13 NOTE — TELEPHONE ENCOUNTER
Hi I was calling to make an appointment with my doctor Lenard Alvarez. This is Norbert Fregoso. My phone number is [phone] and my YOB: 1985. Scheduling it regarding pain in my left ankle. All the details are on my chart. I was told to call and make an appointment. Thank you. Left message with patient to return call to office to schedule an appointment.

## 2024-01-08 ENCOUNTER — TELEPHONE (OUTPATIENT)
Dept: FAMILY MEDICINE CLINIC | Facility: CLINIC | Age: 39
End: 2024-01-08

## 2024-01-08 NOTE — TELEPHONE ENCOUNTER
PT scheduled appt for Weds - but since her muscle spasm is constant in her left leg , she wants to know if she should be seen sooner.

## 2024-01-10 ENCOUNTER — OFFICE VISIT (OUTPATIENT)
Dept: FAMILY MEDICINE CLINIC | Facility: CLINIC | Age: 39
End: 2024-01-10
Payer: COMMERCIAL

## 2024-01-10 VITALS
RESPIRATION RATE: 15 BRPM | SYSTOLIC BLOOD PRESSURE: 124 MMHG | HEART RATE: 75 BPM | WEIGHT: 293 LBS | BODY MASS INDEX: 43.4 KG/M2 | DIASTOLIC BLOOD PRESSURE: 80 MMHG | TEMPERATURE: 98.1 F | HEIGHT: 69 IN | OXYGEN SATURATION: 98 %

## 2024-01-10 DIAGNOSIS — G89.29 CHRONIC BILATERAL LOW BACK PAIN WITHOUT SCIATICA: Primary | ICD-10-CM

## 2024-01-10 DIAGNOSIS — M54.50 CHRONIC BILATERAL LOW BACK PAIN WITHOUT SCIATICA: Primary | ICD-10-CM

## 2024-01-10 PROCEDURE — 99213 OFFICE O/P EST LOW 20 MIN: CPT | Performed by: PHYSICIAN ASSISTANT

## 2024-01-10 RX ORDER — NABUMETONE 750 MG/1
750 TABLET, FILM COATED ORAL 2 TIMES DAILY
Qty: 40 TABLET | Refills: 1 | Status: SHIPPED | OUTPATIENT
Start: 2024-01-10

## 2024-01-10 NOTE — PROGRESS NOTES
"Assessment/Plan:    Chronic low back pain - has appt with chiro on Monday, encouraged alternating heat and ice to area, stretching and can take relafen bid with food as needed    F/u 1 month no better or sooner if needed    Subjective:   Chief Complaint   Patient presents with    Foot Pain    Leg Pain      Patient ID: Dariela Snyder is a 38 y.o. female.    Patient here complaining of left \"rump\" pain, radiating into left leg, tingling into leg down into toes. Now with pain in the back of foot, worse with rotating ankle. Muscle spasms in legs.     Has had back pain for a few years, recently flared in the past month. Now in past weeks even worse. Intermittent, worse with constant movement. With sitting or sleeping no issues.     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    Talked to work since pain is worse there and said it was not work related.     Tried yoga with no relief. Ibuprofen with no relief.     Seeing chiropractor Franchesca Carrasco in [place].       The following portions of the patient's history were reviewed and updated as appropriate: allergies, current medications, past family history, past medical history, past social history, past surgical history, and problem list.    Past Medical History:   Diagnosis Date    Depression with anxiety 3/3/2016    HTN (hypertension) 2/4/2016    Transitioned From: Blood pressure elevated    Hypertension     Morbid obesity (HCC)      Past Surgical History:   Procedure Laterality Date    NO PAST SURGERIES       Family History   Problem Relation Age of Onset    No Known Problems Mother     Cancer Father     Alcohol abuse Father         alcoholism    Mental illness Neg Hx     Substance Abuse Neg Hx      Social History     Socioeconomic History    Marital status: Single     Spouse name: Not on file    Number of children: Not on file    Years of education: Not on file    Highest education level: Not on file   Occupational History    Not on file   Tobacco Use " "   Smoking status: Never    Smokeless tobacco: Never   Vaping Use    Vaping status: Never Used   Substance and Sexual Activity    Alcohol use: No    Drug use: Not Currently     Comment: uses marijuana    Sexual activity: Yes     Partners: Male     Birth control/protection: I.U.D.     Comment: 2 partners in the past 2 years   Other Topics Concern    Not on file   Social History Narrative    Always uses seat belt    Daily caffeinated coffee consumption-3-4 cups coffee or tea daily    Denied: history of has smoke detectors     Social Determinants of Health     Financial Resource Strain: Not on file   Food Insecurity: Not on file   Transportation Needs: Not on file   Physical Activity: Not on file   Stress: Not on file   Social Connections: Not on file   Intimate Partner Violence: Not on file   Housing Stability: Not on file       Current Outpatient Medications:     b complex vitamins capsule, Take 1 capsule by mouth daily B6 100 mg daily, Disp: , Rfl:     BIOTIN PO, Take by mouth in the morning Hair skin and nails, Disp: , Rfl:     cholecalciferol (VITAMIN D3) 1,000 units tablet, Take 1,000 Units by mouth daily 25 mcg, Disp: , Rfl:     levonorgestrel (MIRENA) 20 MCG/24HR IUD, by Intrauterine route Liletta, Disp: , Rfl:     multivitamin (THERAGRAN) TABS, Take 1 tablet by mouth daily, Disp: , Rfl:     Omega-3 Fatty Acids (fish oil) 1,000 mg, Take 1,000 mg by mouth daily, Disp: , Rfl:     methylPREDNISolone 4 MG tablet therapy pack, Use as directed on package (Patient not taking: Reported on 10/13/2023), Disp: 21 tablet, Rfl: 0    omeprazole (PriLOSEC) 20 mg delayed release capsule, Take 1 capsule (20 mg total) by mouth daily for 14 days (Patient not taking: Reported on 12/20/2023), Disp: 14 capsule, Rfl: 0    Review of Systems          Objective:    Vitals:    01/10/24 1452   BP: 124/80   Pulse: 75   Resp: 15   Temp: 98.1 °F (36.7 °C)   TempSrc: Temporal   SpO2: 98%   Weight: (!) 140 kg (308 lb 3.2 oz)   Height: 5' 8.5\" " (1.74 m)        Physical Exam  Constitutional:       Appearance: Normal appearance. She is well-developed and normal weight.   HENT:      Head: Normocephalic and atraumatic.   Neck:      Vascular: No carotid bruit.   Cardiovascular:      Rate and Rhythm: Normal rate and regular rhythm.      Pulses: Normal pulses.      Heart sounds: Normal heart sounds.   Pulmonary:      Effort: Pulmonary effort is normal.      Breath sounds: Normal breath sounds.   Musculoskeletal:         General: Normal range of motion.      Cervical back: Normal range of motion and neck supple.      Right lower leg: No edema.      Left lower leg: No edema.      Comments: Pain perceived over L4/5 region of spine and paraspinal muscle, full ROM, negative SLR, heel/toe walk normal   Skin:     General: Skin is warm.   Neurological:      General: No focal deficit present.      Mental Status: She is alert and oriented to person, place, and time.      Sensory: No sensory deficit.      Motor: No weakness.      Coordination: Coordination normal.      Gait: Gait normal.      Deep Tendon Reflexes: Reflexes normal.   Psychiatric:         Mood and Affect: Mood normal.         Behavior: Behavior normal.         Thought Content: Thought content normal.         Judgment: Judgment normal.

## 2024-04-11 ENCOUNTER — OFFICE VISIT (OUTPATIENT)
Dept: FAMILY MEDICINE CLINIC | Facility: CLINIC | Age: 39
End: 2024-04-11
Payer: COMMERCIAL

## 2024-04-11 VITALS
SYSTOLIC BLOOD PRESSURE: 138 MMHG | OXYGEN SATURATION: 98 % | TEMPERATURE: 98 F | HEART RATE: 85 BPM | HEIGHT: 68 IN | DIASTOLIC BLOOD PRESSURE: 82 MMHG | WEIGHT: 293 LBS | BODY MASS INDEX: 44.41 KG/M2 | RESPIRATION RATE: 16 BRPM

## 2024-04-11 DIAGNOSIS — I83.93 ASYMPTOMATIC VARICOSE VEINS OF BOTH LOWER EXTREMITIES: ICD-10-CM

## 2024-04-11 DIAGNOSIS — M51.26 HNP (HERNIATED NUCLEUS PULPOSUS), LUMBAR: Primary | ICD-10-CM

## 2024-04-11 DIAGNOSIS — L08.9 SKIN PUSTULE: ICD-10-CM

## 2024-04-11 PROCEDURE — 99214 OFFICE O/P EST MOD 30 MIN: CPT | Performed by: PHYSICIAN ASSISTANT

## 2024-04-11 RX ORDER — CEPHALEXIN 500 MG/1
500 CAPSULE ORAL 3 TIMES DAILY
Qty: 21 CAPSULE | Refills: 0 | Status: SHIPPED | OUTPATIENT
Start: 2024-04-11 | End: 2024-04-18

## 2024-04-11 NOTE — PROGRESS NOTES
Assessment/Plan:    HNP/DDD - MRI in [facility], see Pain and Sp[facility]    2. Pustule post auricular right - warm compresses, keflex tid x 7 days    3. Varicose veins - weight loss, compression stockings    F/u as needed    Subjective:   Chief Complaint   Patient presents with    Leg Pain     Chronic issue  Worsening pain in left leg  Following with chiropractor   Cluster of veins on left thigh    Mass     Lump behind right ear over the last 2 weeks      Patient ID: Dariela Snyder is a 38 y.o. female.    Patient still with back pain radiating into left leg, had MRI, seeing chiro. Could not tolerate steroids.    Small bump behind right ear, tender. No trauma. Has been irritating it.    Bulging veins both legs, not tender. Massage therapist concerned would like it evaluated. Can throb at time.        The following portions of the patient's history were reviewed and updated as appropriate: allergies, current medications, past family history, past medical history, past social history, past surgical history, and problem list.    Past Medical History:   Diagnosis Date    Depression with anxiety 3/3/2016    HTN (hypertension) 2/4/2016    Transitioned From: Blood pressure elevated    Hypertension     Morbid obesity (HCC)      Past Surgical History:   Procedure Laterality Date    NO PAST SURGERIES       Family History   Problem Relation Age of Onset    No Known Problems Mother     Cancer Father     Alcohol abuse Father         alcoholism    Mental illness Neg Hx     Substance Abuse Neg Hx      Social History     Socioeconomic History    Marital status: Single     Spouse name: Not on file    Number of children: Not on file    Years of education: Not on file    Highest education level: Not on file   Occupational History    Not on file   Tobacco Use    Smoking status: Never    Smokeless tobacco: Never   Vaping Use    Vaping status: Never Used   Substance and Sexual Activity    Alcohol use: No    Drug use: Not Currently     Comment: uses  "marijuana    Sexual activity: Yes     Partners: Male     Birth control/protection: I.U.D.     Comment: 2 partners in the past 2 years   Other Topics Concern    Not on file   Social History Narrative    Always uses seat belt    Daily caffeinated coffee consumption-3-4 cups coffee or tea daily    Denied: history of has smoke detectors     Social Determinants of Health     Financial Resource Strain: Not on file   Food Insecurity: Not on file   Transportation Needs: Not on file   Physical Activity: Not on file   Stress: Not on file   Social Connections: Not on file   Intimate Partner Violence: Not on file   Housing Stability: Not on file       Current Outpatient Medications:     b complex vitamins capsule, Take 1 capsule by mouth daily B6 100 mg daily, Disp: , Rfl:     BIOTIN PO, Take by mouth in the morning Hair skin and nails, Disp: , Rfl:     cholecalciferol (VITAMIN D3) 1,000 units tablet, Take 1,000 Units by mouth daily 25 mcg, Disp: , Rfl:     levonorgestrel (MIRENA) 20 MCG/24HR IUD, by Intrauterine route Liletta, Disp: , Rfl:     multivitamin (THERAGRAN) TABS, Take 1 tablet by mouth daily, Disp: , Rfl:     nabumetone (RELAFEN) 750 mg tablet, Take 1 tablet (750 mg total) by mouth 2 (two) times a day, Disp: 40 tablet, Rfl: 1    methylPREDNISolone 4 MG tablet therapy pack, Use as directed on package (Patient not taking: Reported on 10/13/2023), Disp: 21 tablet, Rfl: 0    Omega-3 Fatty Acids (fish oil) 1,000 mg, Take 1,000 mg by mouth daily (Patient not taking: Reported on 4/11/2024), Disp: , Rfl:     omeprazole (PriLOSEC) 20 mg delayed release capsule, Take 1 capsule (20 mg total) by mouth daily for 14 days (Patient not taking: Reported on 12/20/2023), Disp: 14 capsule, Rfl: 0    Review of Systems          Objective:    Vitals:    04/11/24 1429   BP: 138/82   Pulse: 85   Resp: 16   Temp: 98 °F (36.7 °C)   TempSrc: Temporal   SpO2: 98%   Weight: (!) 139 kg (307 lb)   Height: 5' 8\" (1.727 m)        Physical " Exam  Constitutional:       Appearance: Normal appearance. She is well-developed and normal weight.   HENT:      Head: Normocephalic and atraumatic.      Ears:      Comments: Right post auricular 4 mm pustular lesion, erythematous  Neck:      Vascular: No carotid bruit.   Cardiovascular:      Rate and Rhythm: Normal rate and regular rhythm.      Pulses: Normal pulses.      Heart sounds: Normal heart sounds.   Pulmonary:      Effort: Pulmonary effort is normal.      Breath sounds: Normal breath sounds.   Musculoskeletal:         General: Normal range of motion.      Cervical back: Normal range of motion and neck supple.      Right lower leg: No edema.      Left lower leg: No edema.   Skin:     General: Skin is warm.      Comments: B/l legs with buoyant nontender bulging veins   Neurological:      General: No focal deficit present.      Mental Status: She is alert and oriented to person, place, and time.   Psychiatric:         Mood and Affect: Mood normal.         Behavior: Behavior normal.         Thought Content: Thought content normal.         Judgment: Judgment normal.

## 2024-04-15 ENCOUNTER — TELEPHONE (OUTPATIENT)
Dept: FAMILY MEDICINE CLINIC | Facility: CLINIC | Age: 39
End: 2024-04-15

## 2024-04-15 NOTE — TELEPHONE ENCOUNTER
Dariela called due to having an upcoming appt with spine and pain management tomorrow and they requested the last 3 recent office visit notes to be faxed over. Pt provided fax number.   They have been faxed over.    Fax number

## 2024-04-23 ENCOUNTER — OFFICE VISIT (OUTPATIENT)
Dept: OBGYN CLINIC | Facility: CLINIC | Age: 39
End: 2024-04-23
Payer: COMMERCIAL

## 2024-04-23 VITALS
WEIGHT: 293 LBS | SYSTOLIC BLOOD PRESSURE: 128 MMHG | BODY MASS INDEX: 44.41 KG/M2 | DIASTOLIC BLOOD PRESSURE: 68 MMHG | HEIGHT: 68 IN

## 2024-04-23 DIAGNOSIS — N76.0 ACUTE VAGINITIS: Primary | ICD-10-CM

## 2024-04-23 LAB
BV WHIFF TEST VAG QL: POSITIVE
CLUE CELLS SPEC QL WET PREP: POSITIVE
PH SMN: ABNORMAL [PH]
T VAGINALIS VAG QL WET PREP: NEGATIVE
YEAST VAG QL WET PREP: NEGATIVE

## 2024-04-23 PROCEDURE — 99213 OFFICE O/P EST LOW 20 MIN: CPT | Performed by: NURSE PRACTITIONER

## 2024-04-23 PROCEDURE — 87210 SMEAR WET MOUNT SALINE/INK: CPT | Performed by: NURSE PRACTITIONER

## 2024-04-23 RX ORDER — METRONIDAZOLE 500 MG/1
500 TABLET ORAL EVERY 12 HOURS
Qty: 14 TABLET | Refills: 0 | Status: SHIPPED | OUTPATIENT
Start: 2024-04-23 | End: 2024-04-30

## 2024-04-23 NOTE — PROGRESS NOTES
[facility]/GYN - [place] [id number] [place] [address]    Assessment/Plan:  1. Acute vaginitis  -     SURESWAB(R) ADVANCED VAGINITIS PLUS, TMA  -     metroNIDAZOLE (FLAGYL) 500 mg tablet; Take 1 tablet (500 mg total) by mouth every 12 (twelve) hours for 7 days Do not drink alcohol while taking this medication.  -     POCT wet mount    Wet mount findings consistent with BV  Discussed findings, treatment  Return visit PRN/annual in November.    Subjective:   Dariela Snyder is a 38 y.o.  female.  CC: Irritation      HPI:   38 year old female presents for office visit with complaints of vaginal irritation for 4-5 days. Feels irritated when wiping. Not noticing any discharge or odor, otherwise feels well.  New partner recently, interested in STI screen.         Gyn History  No LMP recorded. Patient has had an implant.       Last pap smear: 2023    She  reports being sexually active and has had partner(s) who are male. She reports using the following method of birth control/protection: I.U.D..       OB History      Past Medical History:  3/3/2016: Depression with anxiety  2016: HTN (hypertension)      Comment:  Transitioned From: Blood pressure elevated  No date: Hypertension  No date: Morbid obesity (HCC)     Past Surgical History:  No date: NO PAST SURGERIES     Social History     Tobacco Use    Smoking status: Never    Smokeless tobacco: Never   Vaping Use    Vaping status: Never Used   Substance Use Topics    Alcohol use: No    Drug use: Not Currently     Comment: uses marijuana          Current Outpatient Medications:     b complex vitamins capsule, Take 1 capsule by mouth daily B6 100 mg daily, Disp: , Rfl:     BIOTIN PO, Take by mouth in the morning Hair skin and nails, Disp: , Rfl:     cholecalciferol (VITAMIN D3) 1,000 units tablet, Take 1,000 Units by mouth daily 25 mcg, Disp: , Rfl:     levonorgestrel (MIRENA) 20 MCG/24HR IUD, by Intrauterine route Liletta, Disp: , Rfl:      "metroNIDAZOLE (FLAGYL) 500 mg tablet, Take 1 tablet (500 mg total) by mouth every 12 (twelve) hours for 7 days Do not drink alcohol while taking this medication., Disp: 14 tablet, Rfl: 0    multivitamin (THERAGRAN) TABS, Take 1 tablet by mouth daily, Disp: , Rfl:     nabumetone (RELAFEN) 750 mg tablet, Take 1 tablet (750 mg total) by mouth 2 (two) times a day, Disp: 40 tablet, Rfl: 1    Omega-3 Fatty Acids (fish oil) 1,000 mg, Take 1,000 mg by mouth daily, Disp: , Rfl:     She is allergic to dust mite extract and latex..    ROS: Review of Systems Negative except as noted in HPI    Objective:  /68 (BP Location: Left arm, Patient Position: Sitting, Cuff Size: Standard)   Ht 5' 8\" (1.727 m)   Wt (!) 137 kg (301 lb)   Breastfeeding No   BMI 45.77 kg/m²      Physical Exam  Constitutional:       General: She is not in acute distress.     Appearance: Normal appearance.   Genitourinary:     General: Normal vulva.      Labia:         Right: No lesion.         Left: No lesion.       Urethra: No prolapse or urethral lesion.      Vagina: Vaginal discharge present. No bleeding or lesions.      Cervix: Normal. No friability, lesion or cervical bleeding.      Uterus: Normal. Not tender.       Adnexa: Right adnexa normal and left adnexa normal.        Right: No mass or tenderness.          Left: No mass or tenderness.        Comments: Thin, white discharge noted on exam. IUD strings visible.  Wet mount demonstrates clue cells, no yeast visualized, PH 4.5.  Skin:     General: Skin is warm and dry.   Neurological:      Mental Status: She is alert.   Psychiatric:         Thought Content: Thought content normal.         Judgment: Judgment normal.         "

## 2024-04-24 LAB
BV BACTERIA RRNA VAG QL NAA+PROBE: POSITIVE
C GLABRATA RNA VAG QL NAA+PROBE: NOT DETECTED
C TRACH RRNA SPEC QL NAA+PROBE: NOT DETECTED
CANDIDA RRNA VAG QL PROBE: NOT DETECTED
N GONORRHOEA RRNA SPEC QL NAA+PROBE: NOT DETECTED
T VAGINALIS RRNA SPEC QL NAA+PROBE: NOT DETECTED

## 2024-12-05 ENCOUNTER — OFFICE VISIT (OUTPATIENT)
Dept: FAMILY MEDICINE CLINIC | Facility: CLINIC | Age: 39
End: 2024-12-05
Payer: COMMERCIAL

## 2024-12-05 VITALS
RESPIRATION RATE: 16 BRPM | TEMPERATURE: 97.7 F | BODY MASS INDEX: 43.4 KG/M2 | HEIGHT: 69 IN | OXYGEN SATURATION: 100 % | WEIGHT: 293 LBS | DIASTOLIC BLOOD PRESSURE: 90 MMHG | HEART RATE: 95 BPM | SYSTOLIC BLOOD PRESSURE: 142 MMHG

## 2024-12-05 DIAGNOSIS — J02.9 SORE THROAT: Primary | ICD-10-CM

## 2024-12-05 DIAGNOSIS — J02.0 STREP THROAT: ICD-10-CM

## 2024-12-05 LAB — S PYO AG THROAT QL: POSITIVE

## 2024-12-05 PROCEDURE — 87880 STREP A ASSAY W/OPTIC: CPT | Performed by: PHYSICIAN ASSISTANT

## 2024-12-05 PROCEDURE — 99213 OFFICE O/P EST LOW 20 MIN: CPT | Performed by: PHYSICIAN ASSISTANT

## 2024-12-05 RX ORDER — AMOXICILLIN 500 MG/1
500 CAPSULE ORAL EVERY 8 HOURS SCHEDULED
Qty: 30 CAPSULE | Refills: 0 | Status: SHIPPED | OUTPATIENT
Start: 2024-12-05 | End: 2024-12-15

## 2024-12-05 NOTE — PROGRESS NOTES
Assessment/Plan:            Subjective:   Chief Complaint   Patient presents with    Sore Throat     Since Monday swollen uvula   Hurts to swallow  Occasional headache and some congestion  No other cold symptoms      Patient ID: Dariela Snyder is a 39 y.o. female.    Patient with sore throat, uvula swollen for 3 days. Took ibuprofen without relief. Some congestion. Denies n/v/d. Denies sick contacts.         The following portions of the patient's history were reviewed and updated as appropriate: allergies, current medications, past family history, past medical history, past social history, past surgical history, and problem list.    Past Medical History:   Diagnosis Date    Depression with anxiety 3/3/2016    HTN (hypertension) 2/4/2016    Transitioned From: Blood pressure elevated    Hypertension     Morbid obesity (HCC)      Past Surgical History:   Procedure Laterality Date    NO PAST SURGERIES       Family History   Problem Relation Age of Onset    No Known Problems Mother     Cancer Father     Alcohol abuse Father         alcoholism    Mental illness Neg Hx     Substance Abuse Neg Hx      Social History     Socioeconomic History    Marital status: Single     Spouse name: Not on file    Number of children: Not on file    Years of education: Not on file    Highest education level: Not on file   Occupational History    Not on file   Tobacco Use    Smoking status: Never    Smokeless tobacco: Never   Vaping Use    Vaping status: Never Used   Substance and Sexual Activity    Alcohol use: No    Drug use: Not Currently     Comment: uses marijuana    Sexual activity: Yes     Partners: Male     Birth control/protection: I.U.D.     Comment: 2 partners in the past 2 years   Other Topics Concern    Not on file   Social History Narrative    Always uses seat belt    Daily caffeinated coffee consumption-3-4 cups coffee or tea daily    Denied: history of has smoke detectors     Social Drivers of Health     Financial Resource  "Strain: Not on file   Food Insecurity: Not on file   Transportation Needs: Not on file   Physical Activity: Not on file   Stress: Not on file   Social Connections: Not on file   Intimate Partner Violence: Not on file   Housing Stability: Not on file       Current Outpatient Medications:     b complex vitamins capsule, Take 1 capsule by mouth daily B6 100 mg daily, Disp: , Rfl:     BIOTIN PO, Take by mouth in the morning Hair skin and nails, Disp: , Rfl:     cholecalciferol (VITAMIN D3) 1,000 units tablet, Take 1,000 Units by mouth daily 25 mcg, Disp: , Rfl:     levonorgestrel (MIRENA) 20 MCG/24HR IUD, by Intrauterine route Liletta, Disp: , Rfl:     multivitamin (THERAGRAN) TABS, Take 1 tablet by mouth daily, Disp: , Rfl:     Omega-3 Fatty Acids (fish oil) 1,000 mg, Take 1,000 mg by mouth daily, Disp: , Rfl:     nabumetone (RELAFEN) 750 mg tablet, Take 1 tablet (750 mg total) by mouth 2 (two) times a day (Patient not taking: Reported on 12/5/2024), Disp: 40 tablet, Rfl: 1    Review of Systems          Objective:    Vitals:    12/05/24 1234   BP: 142/90   Pulse: 95   Resp: 16   Temp: 97.7 °F (36.5 °C)   SpO2: 100%   Weight: (!) 152 kg (334 lb 6.4 oz)   Height: 5' 8.5\" (1.74 m)        Physical Exam  Constitutional:       Appearance: Normal appearance. She is well-developed and normal weight.   HENT:      Head: Normocephalic and atraumatic.      Right Ear: Tympanic membrane, ear canal and external ear normal.      Left Ear: Tympanic membrane, ear canal and external ear normal.      Nose: Nose normal.      Mouth/Throat:      Mouth: Mucous membranes are moist.      Pharynx: Oropharynx is clear. Posterior oropharyngeal erythema present. No oropharyngeal exudate.   Eyes:      Conjunctiva/sclera: Conjunctivae normal.   Cardiovascular:      Rate and Rhythm: Normal rate and regular rhythm.      Pulses: Normal pulses.      Heart sounds: Normal heart sounds.   Pulmonary:      Effort: Pulmonary effort is normal.      Breath " sounds: Normal breath sounds.   Musculoskeletal:      Cervical back: Normal range of motion and neck supple.   Lymphadenopathy:      Cervical: Cervical adenopathy present.   Skin:     General: Skin is warm.   Neurological:      General: No focal deficit present.      Mental Status: She is alert and oriented to person, place, and time.   Psychiatric:         Mood and Affect: Mood normal.         Behavior: Behavior normal.         Thought Content: Thought content normal.         Judgment: Judgment normal.

## 2025-02-05 ENCOUNTER — OFFICE VISIT (OUTPATIENT)
Dept: FAMILY MEDICINE CLINIC | Facility: CLINIC | Age: 40
End: 2025-02-05
Payer: COMMERCIAL

## 2025-02-05 VITALS
TEMPERATURE: 97.5 F | DIASTOLIC BLOOD PRESSURE: 84 MMHG | BODY MASS INDEX: 44.41 KG/M2 | HEIGHT: 68 IN | OXYGEN SATURATION: 98 % | HEART RATE: 90 BPM | WEIGHT: 293 LBS | SYSTOLIC BLOOD PRESSURE: 130 MMHG | RESPIRATION RATE: 16 BRPM

## 2025-02-05 DIAGNOSIS — L29.0 ITCHY ANUS: ICD-10-CM

## 2025-02-05 DIAGNOSIS — R10.84 ABDOMINAL PAIN, GENERALIZED: Primary | ICD-10-CM

## 2025-02-05 DIAGNOSIS — L05.91 PILONIDAL CYST: ICD-10-CM

## 2025-02-05 PROBLEM — E66.01 MORBID OBESITY (HCC): Status: ACTIVE | Noted: 2025-02-05

## 2025-02-05 PROCEDURE — 99214 OFFICE O/P EST MOD 30 MIN: CPT | Performed by: PHYSICIAN ASSISTANT

## 2025-02-05 NOTE — PROGRESS NOTES
"Name: Dariela Snyder      : 1985      MRN: [id number]  Encounter Provider: Ana Wesley PA-C  Encounter Date: 2025   Encounter department: [facility] PRACTICE  :  Assessment & Plan  Abdominal pain, generalized    Etiology unclear will start with labs, urine. Consider GI  Orders:    CBC and differential; Future    Comprehensive metabolic panel; Future    UA (URINE) with reflex to Scope; Future    Pilonidal cyst    Reassurance, discussed etiology, when to come in for treatment       Itchy anus    Reassurance, try hypoallergenic soaps, wet wipes to area. If no better in a week follow up       Follow up as needed       History of Present Illness   Has had a mild warmness on left flank, feels \"warm\". Initially was left mid axillary now left front stomach. Comes and goes, can linger a few minutes. Driving can feel it more, sitting more. Does not notice when up walking around. Nothing makes it better. Moving bowels normally, no blood. Urinating normally. No blood. Denies fever, chills.     Itchy anus for a week.     Small squishy bump at top of bottom, not tender now. In past has had drainage and tenderness. Always resolves on its own      Review of Systems    Objective   /84   Pulse 90   Temp 97.5 °F (36.4 °C) (Temporal)   Resp 16   Ht 5' 8\" (1.727 m)   Wt (!) 152 kg (334 lb)   SpO2 98%   BMI 50.78 kg/m²      Physical Exam  Constitutional:       Appearance: Normal appearance. She is well-developed and normal weight.   HENT:      Head: Normocephalic and atraumatic.   Cardiovascular:      Rate and Rhythm: Normal rate and regular rhythm.      Pulses: Normal pulses.      Heart sounds: Normal heart sounds.   Pulmonary:      Effort: Pulmonary effort is normal.      Breath sounds: Normal breath sounds.   Abdominal:      Comments: RLQ RMQ pain with palpation, no guarding, rebound, refer pain. +BS   Genitourinary:     Comments: Small palpable cyst, soft not tender at " sacral tip region  Musculoskeletal:         General: Normal range of motion.      Cervical back: Normal range of motion and neck supple.   Skin:     General: Skin is warm.   Neurological:      General: No focal deficit present.      Mental Status: She is alert and oriented to person, place, and time.   Psychiatric:         Mood and Affect: Mood normal.         Behavior: Behavior normal.         Thought Content: Thought content normal.         Judgment: Judgment normal.

## 2025-02-06 ENCOUNTER — RESULTS FOLLOW-UP (OUTPATIENT)
Dept: FAMILY MEDICINE CLINIC | Facility: CLINIC | Age: 40
End: 2025-02-06

## 2025-02-06 LAB
ALBUMIN SERPL-MCNC: 4.1 G/DL (ref 3.6–5.1)
ALBUMIN/GLOB SERPL: 1.6 (CALC) (ref 1–2.5)
ALP SERPL-CCNC: 60 U/L (ref 31–125)
ALT SERPL-CCNC: 17 U/L (ref 6–29)
APPEARANCE UR: CLEAR
AST SERPL-CCNC: 14 U/L (ref 10–30)
BASOPHILS # BLD AUTO: 60 CELLS/UL (ref 0–200)
BASOPHILS NFR BLD AUTO: 0.9 %
BILIRUB SERPL-MCNC: 0.5 MG/DL (ref 0.2–1.2)
BILIRUB UR QL STRIP: NEGATIVE
BUN SERPL-MCNC: 7 MG/DL (ref 7–25)
BUN/CREAT SERPL: ABNORMAL (CALC) (ref 6–22)
CALCIUM SERPL-MCNC: 9.2 MG/DL (ref 8.6–10.2)
CHLORIDE SERPL-SCNC: 102 MMOL/L (ref 98–110)
CO2 SERPL-SCNC: 33 MMOL/L (ref 20–32)
COLOR UR: YELLOW
CREAT SERPL-MCNC: 0.7 MG/DL (ref 0.5–0.97)
EOSINOPHIL # BLD AUTO: 127 CELLS/UL (ref 15–500)
EOSINOPHIL NFR BLD AUTO: 1.9 %
ERYTHROCYTE [DISTWIDTH] IN BLOOD BY AUTOMATED COUNT: 12.7 % (ref 11–15)
GFR/BSA.PRED SERPLBLD CYS-BASED-ARV: 113 ML/MIN/1.73M2
GLOBULIN SER CALC-MCNC: 2.6 G/DL (CALC) (ref 1.9–3.7)
GLUCOSE SERPL-MCNC: 83 MG/DL (ref 65–99)
GLUCOSE UR QL STRIP: NEGATIVE
HCT VFR BLD AUTO: 42 % (ref 35–45)
HGB BLD-MCNC: 13.3 G/DL (ref 11.7–15.5)
HGB UR QL STRIP: NEGATIVE
KETONES UR QL STRIP: NEGATIVE
LEUKOCYTE ESTERASE UR QL STRIP: NEGATIVE
LYMPHOCYTES # BLD AUTO: 2901 CELLS/UL (ref 850–3900)
LYMPHOCYTES NFR BLD AUTO: 43.3 %
MCH RBC QN AUTO: 28.3 PG (ref 27–33)
MCHC RBC AUTO-ENTMCNC: 31.7 G/DL (ref 32–36)
MCV RBC AUTO: 89.4 FL (ref 80–100)
MONOCYTES # BLD AUTO: 375 CELLS/UL (ref 200–950)
MONOCYTES NFR BLD AUTO: 5.6 %
NEUTROPHILS # BLD AUTO: 3236 CELLS/UL (ref 1500–7800)
NEUTROPHILS NFR BLD AUTO: 48.3 %
NITRITE UR QL STRIP: NEGATIVE
PH UR STRIP: 7.5 [PH] (ref 5–8)
PLATELET # BLD AUTO: 320 THOUSAND/UL (ref 140–400)
PMV BLD REES-ECKER: 10.3 FL (ref 7.5–12.5)
POTASSIUM SERPL-SCNC: 4.5 MMOL/L (ref 3.5–5.3)
PROT SERPL-MCNC: 6.7 G/DL (ref 6.1–8.1)
PROT UR QL STRIP: NEGATIVE
RBC # BLD AUTO: 4.7 MILLION/UL (ref 3.8–5.1)
SODIUM SERPL-SCNC: 139 MMOL/L (ref 135–146)
SP GR UR STRIP: 1.01 (ref 1–1.03)
WBC # BLD AUTO: 6.7 THOUSAND/UL (ref 3.8–10.8)

## 2025-02-07 DIAGNOSIS — R10.84 ABDOMINAL PAIN, GENERALIZED: Primary | ICD-10-CM

## 2025-02-18 ENCOUNTER — OFFICE VISIT (OUTPATIENT)
Dept: FAMILY MEDICINE CLINIC | Facility: CLINIC | Age: 40
End: 2025-02-18
Payer: COMMERCIAL

## 2025-02-18 VITALS
HEART RATE: 101 BPM | OXYGEN SATURATION: 99 % | SYSTOLIC BLOOD PRESSURE: 124 MMHG | RESPIRATION RATE: 18 BRPM | TEMPERATURE: 97.7 F | HEIGHT: 68 IN | WEIGHT: 293 LBS | DIASTOLIC BLOOD PRESSURE: 86 MMHG | BODY MASS INDEX: 44.41 KG/M2

## 2025-02-18 DIAGNOSIS — J20.8 ACUTE VIRAL BRONCHITIS: ICD-10-CM

## 2025-02-18 DIAGNOSIS — R09.81 NASAL CONGESTION: Primary | ICD-10-CM

## 2025-02-18 LAB
SARS-COV-2 AG UPPER RESP QL IA: NEGATIVE
VALID CONTROL: NORMAL

## 2025-02-18 PROCEDURE — 87811 SARS-COV-2 COVID19 W/OPTIC: CPT | Performed by: PHYSICIAN ASSISTANT

## 2025-02-18 PROCEDURE — 99213 OFFICE O/P EST LOW 20 MIN: CPT | Performed by: PHYSICIAN ASSISTANT

## 2025-02-18 RX ORDER — PREDNISONE 20 MG/1
40 TABLET ORAL DAILY
Qty: 10 TABLET | Refills: 0 | Status: SHIPPED | OUTPATIENT
Start: 2025-02-18 | End: 2025-02-23

## 2025-02-18 RX ORDER — ALBUTEROL SULFATE 90 UG/1
2 INHALANT RESPIRATORY (INHALATION) EVERY 6 HOURS PRN
Qty: 18 G | Refills: 5 | Status: SHIPPED | OUTPATIENT
Start: 2025-02-18

## 2025-02-18 NOTE — PROGRESS NOTES
"Name: Dariela Snyder      : 1985      MRN: [id number]  Encounter Provider: Ana Wesley PA-C  Encounter Date: 2025   Encounter department: [facility] PRACTICE  :  Assessment & Plan  Acute viral bronchitis    Prednisone 40 mg once daily for 5 days, albuterol prn, mucinex, fluids, rest. if no improvement in 72 hours consider antibiotic.  Orders:    predniSONE 20 mg tablet; Take 2 tablets (40 mg total) by mouth daily for 5 days    albuterol (Ventolin HFA) 90 mcg/act inhaler; Inhale 2 puffs every 6 (six) hours as needed for wheezing    F/u as needed       History of Present Illness   Patient here has been sick since last Monday, started as fatigue but moved into cough. Cough mostly productive, yellow mucus. Some nasal congestion. Denies SOB. Can feel some rhaspy in chest. Denies fever, chills. Tried mucinex, liquid stuff but returns. Has taken 3 x a day with no relief. Feverish feeling.    Cough      Review of Systems   Respiratory:  Positive for cough.        Objective   /86 (Patient Position: Sitting, Cuff Size: Standard)   Pulse 101   Temp 97.7 °F (36.5 °C) (Temporal)   Resp 18   Ht 5' 8\" (1.727 m)   Wt (!) 153 kg (336 lb 9.6 oz)   SpO2 99%   BMI 51.18 kg/m²      Physical Exam  Constitutional:       General: She is not in acute distress.     Appearance: Normal appearance. She is well-developed. She is not toxic-appearing.   HENT:      Head: Normocephalic and atraumatic.      Right Ear: Tympanic membrane, ear canal and external ear normal.      Left Ear: Tympanic membrane, ear canal and external ear normal.      Nose: Mucosal edema, congestion and rhinorrhea present.      Mouth/Throat:      Mouth: Mucous membranes are moist.      Pharynx: Oropharynx is clear. No oropharyngeal exudate or posterior oropharyngeal erythema.   Eyes:      Extraocular Movements: Extraocular movements intact.      Conjunctiva/sclera: Conjunctivae normal.   Cardiovascular:      " Rate and Rhythm: Normal rate and regular rhythm.      Pulses: Normal pulses.      Heart sounds: Normal heart sounds.   Pulmonary:      Effort: Pulmonary effort is normal.      Breath sounds: Wheezing and rhonchi present.   Musculoskeletal:         General: Normal range of motion.      Cervical back: Normal range of motion and neck supple. No rigidity or tenderness.   Lymphadenopathy:      Cervical: No cervical adenopathy.   Skin:     General: Skin is warm.   Neurological:      General: No focal deficit present.      Mental Status: She is alert and oriented to person, place, and time.   Psychiatric:         Mood and Affect: Mood normal.         Behavior: Behavior normal.         Thought Content: Thought content normal.         Judgment: Judgment normal.

## 2025-02-18 NOTE — LETTER
February 18, 2025     Patient: Dariela Snyder  YOB: 1985  Date of Visit: 2/18/2025      To Whom it May Concern:    Dariela Snyder is under my professional care. Dariela was seen in my office on 2/18/2025. Dariela may return to work on 2/18/2025 .    If you have any questions or concerns, please don't hesitate to call.         Sincerely,          Ana Wesley PA-C        CC: No Recipients

## 2025-02-28 ENCOUNTER — OFFICE VISIT (OUTPATIENT)
Dept: GASTROENTEROLOGY | Facility: CLINIC | Age: 40
End: 2025-02-28
Payer: COMMERCIAL

## 2025-02-28 VITALS
SYSTOLIC BLOOD PRESSURE: 141 MMHG | BODY MASS INDEX: 44.41 KG/M2 | WEIGHT: 293 LBS | HEIGHT: 68 IN | DIASTOLIC BLOOD PRESSURE: 89 MMHG

## 2025-02-28 DIAGNOSIS — R10.84 ABDOMINAL PAIN, GENERALIZED: ICD-10-CM

## 2025-02-28 DIAGNOSIS — R10.32 LLQ PAIN: Primary | ICD-10-CM

## 2025-02-28 PROCEDURE — 99203 OFFICE O/P NEW LOW 30 MIN: CPT | Performed by: STUDENT IN AN ORGANIZED HEALTH CARE EDUCATION/TRAINING PROGRAM

## 2025-02-28 NOTE — PROGRESS NOTES
Name: Dariela Snyder      : 1985      MRN: [id number]  Encounter Provider: Ludin Vora DO  Encounter Date: 2025   [facility]  :  Assessment & Plan  Abdominal pain, generalized  Considerations for left lower quadrant pain include musculoskeletal strain, colonic pathology including colitis/diverticulitis.  She has no diarrhea, fever, blood or mucus per rectum.  Renal stones is another consideration but she has no typical urinary symptoms otherwise. Colonoscopy could be considered but would begin with CT scan to ensure no inflammation that needs to be addressed first.  We will follow-up the results of the CT scan and reassess her clinical symptoms to determine if colonoscopy should be considered.  Orders:    Ambulatory Referral to Gastroenterology    LLQ pain  As above due to considerations including diverticulitis plan for CT scan.  Will follow-up results to determine neck step including colonoscopy.  Orders:    CT abdomen pelvis w contrast; Future        History of Present Illness   Dariela Snyder is a 39 y.o. female who presents for evaluation of left flank/abdominal pain.  Pain began about 2 months ago on her left flank and then moved anteriorly towards her left lower quadrant.  If Elica combination of pain and warmth.  She had no changes in her bowel movements, blood or mucus per rectum, urinary changes, fevers, chills or other symptoms.  No triggers for her pain are noted.  No inciting trauma or acute events.  She has no personal or family history of colon cancer/IBD to her knowledge. Symptoms gradually improving over the past few weeks, though  to palpation in the left lower quadrant.      HPI  History obtained from: patient  Review of Systems A complete review of systems is negative other than that noted above in the HPI.    Current Outpatient Medications on File Prior to Visit   Medication Sig Dispense Refill    albuterol  "(Ventolin HFA) 90 mcg/act inhaler Inhale 2 puffs every 6 (six) hours as needed for wheezing 18 g 5    b complex vitamins capsule Take 1 capsule by mouth daily B6 100 mg daily      BIOTIN PO Take by mouth in the morning Hair skin and nails      cholecalciferol (VITAMIN D3) 1,000 units tablet Take 1,000 Units by mouth daily 25 mcg      levonorgestrel (MIRENA) 20 MCG/24HR IUD by Intrauterine route Liletta      multivitamin (THERAGRAN) TABS Take 1 tablet by mouth daily      Omega-3 Fatty Acids (fish oil) 1,000 mg Take 1,000 mg by mouth daily      nabumetone (RELAFEN) 750 mg tablet Take 1 tablet (750 mg total) by mouth 2 (two) times a day (Patient not taking: Reported on 12/5/2024) 40 tablet 1     No current facility-administered medications on file prior to visit.      Social History     Tobacco Use    Smoking status: Never    Smokeless tobacco: Never   Vaping Use    Vaping status: Never Used   Substance and Sexual Activity    Alcohol use: No    Drug use: Not Currently     Comment: uses marijuana    Sexual activity: Yes     Partners: Male     Birth control/protection: I.U.D.     Comment: 2 partners in the past 2 years        Objective   /89   Ht 5' 8\" (1.727 m)   Wt (!) 152 kg (334 lb)   BMI 50.78 kg/m²     Physical Exam   General Appearance: NAD, cooperative, alert  Eyes: Anicteric  GI:  LLQ tenderness to palpation; no rebound/guarding  Rectal: Deferred  Musculoskeletal: No edema.  Skin:     No jaundice        Lab Results: I personally reviewed relevant lab results.             "

## 2025-03-13 ENCOUNTER — HOSPITAL ENCOUNTER (OUTPATIENT)
Dept: CT IMAGING | Facility: HOSPITAL | Age: 40
Discharge: HOME | End: 2025-03-13
Attending: STUDENT IN AN ORGANIZED HEALTH CARE EDUCATION/TRAINING PROGRAM
Payer: COMMERCIAL

## 2025-03-13 DIAGNOSIS — R10.32 LLQ PAIN: ICD-10-CM

## 2025-03-13 PROCEDURE — 74177 CT ABD & PELVIS W/CONTRAST: CPT

## 2025-03-13 RX ADMIN — IOHEXOL 100 ML: 350 INJECTION, SOLUTION INTRAVENOUS at 12:50

## 2025-03-14 ENCOUNTER — TELEPHONE (OUTPATIENT)
Age: 40
End: 2025-03-14

## 2025-03-14 DIAGNOSIS — K57.92 DIVERTICULITIS: Primary | ICD-10-CM

## 2025-03-14 RX ORDER — METRONIDAZOLE 500 MG/1
500 TABLET ORAL EVERY 8 HOURS SCHEDULED
Qty: 30 TABLET | Refills: 0 | Status: SHIPPED | OUTPATIENT
Start: 2025-03-14 | End: 2025-03-24

## 2025-03-14 RX ORDER — CIPROFLOXACIN 500 MG/1
500 TABLET, FILM COATED ORAL EVERY 12 HOURS SCHEDULED
Qty: 20 TABLET | Refills: 0 | Status: SHIPPED | OUTPATIENT
Start: 2025-03-14 | End: 2025-03-24

## 2025-03-14 NOTE — TELEPHONE ENCOUNTER
Patients GI provider:  Dr. Vora    Number to return call: [phone]    Reason for call: Pt calling to let Dr Vora know her stomach pain is getting worse. Pt just wanted it to be noted so he knows pt does not need a call back. Pt would like a call as soon as the  sees her CT results from yesterday that are not finalized yet    Scheduled procedure/appointment date if applicable: Apt/procedure

## 2025-03-14 NOTE — PROGRESS NOTES
CT scan is done, pending radiology read, however there is fat stranding around her colon in the left lower quadrant concerning for diverticulitis.  Will await final read from radiology but in the meantime we will start Cipro/Flagyl.  Spoke with patient in the phone, advised to contact me if any worsening pain, fevers, chills, rectal bleeding or other concerning symptoms.

## 2025-03-20 ENCOUNTER — RESULTS FOLLOW-UP (OUTPATIENT)
Dept: GASTROENTEROLOGY | Facility: CLINIC | Age: 40
End: 2025-03-20

## 2025-03-20 ENCOUNTER — TELEPHONE (OUTPATIENT)
Age: 40
End: 2025-03-20

## 2025-03-20 NOTE — TELEPHONE ENCOUNTER
Patients GI provider:  Dr. Vora    Number to return call: [phone]    Reason for call: Pt calling asking to speak about CT results that she received in her MyChart. Significant results.    Scheduled procedure/appointment date if applicable: 3/13/25

## 2025-03-20 NOTE — TELEPHONE ENCOUNTER
Patient called asking if we could take a look at her recent CT scan.  It shows low lying IUD with probable myometrial embedment of the side arms. She is asking if this is of concern?  Would like to remove the IUD.  Please advise how soon she would need to be seen.

## 2025-03-27 ENCOUNTER — PROCEDURE VISIT (OUTPATIENT)
Dept: OBGYN CLINIC | Facility: CLINIC | Age: 40
End: 2025-03-27
Payer: COMMERCIAL

## 2025-03-27 VITALS
HEIGHT: 68 IN | WEIGHT: 293 LBS | SYSTOLIC BLOOD PRESSURE: 140 MMHG | BODY MASS INDEX: 44.41 KG/M2 | DIASTOLIC BLOOD PRESSURE: 92 MMHG

## 2025-03-27 DIAGNOSIS — Z30.431 IUD CHECK UP: ICD-10-CM

## 2025-03-27 DIAGNOSIS — Z30.432 ENCOUNTER FOR IUD REMOVAL: Primary | ICD-10-CM

## 2025-03-27 PROCEDURE — 58301 REMOVE INTRAUTERINE DEVICE: CPT | Performed by: NURSE PRACTITIONER

## 2025-03-27 NOTE — PATIENT INSTRUCTIONS
IUD removed easily  in its entirety and visualized by patient.Use back up method of contraception immediately with sex if needed  Aware of possible irregular menses post removal. F/u [place] to call if no period on own every 3 months and would give provera for withdraw bled

## 2025-03-27 NOTE — PROGRESS NOTES
Here for IUD removal Has [place] inserted 1/2/2020 rare spotting No unusual discharge Denies abd or pelvic pain Had CT 3/13/2025 LLQ pain She was dx with diverticulitis and Low lying IUD with probable myometrial embedment of the side arms. Recommend further evaluation with pelvic ultrasound. She just wants it removed.  She is no longer SA with men and does not need BC H/o irreg periods in past   IUD Procedure    Date/Time: 3/27/2025 10:01 AM    Performed by: FILIPPO Rollins  Authorized by: FILIPPO Rollins    Other Assisting Provider: Yes (comment)    Verbal consent obtained?: Yes    Risks and benefits: Risks, benefits and alternatives were discussed    Patient states understanding of procedure being performed: Yes    Patient identity confirmed:  Verbally with patient  Select procedure: IUD removal    IUD Removal:     Reason for removal: malposition      Removed without complications: yes      Strings visualized: yes      IUD intact: yes    Removal Comments:      IUD removed easily  in its entirety and visualized by patient.Use back up method of contraception immediately with sex if needed  Aware of possible irregular menses post removal. F/u WA to call if no period on own every 3 months and would give provera for withdraw bled

## 2025-03-28 ENCOUNTER — OFFICE VISIT (OUTPATIENT)
Dept: FAMILY MEDICINE CLINIC | Facility: CLINIC | Age: 40
End: 2025-03-28
Payer: COMMERCIAL

## 2025-03-28 ENCOUNTER — TELEPHONE (OUTPATIENT)
Dept: FAMILY MEDICINE CLINIC | Facility: CLINIC | Age: 40
End: 2025-03-28

## 2025-03-28 VITALS
BODY MASS INDEX: 44.41 KG/M2 | DIASTOLIC BLOOD PRESSURE: 84 MMHG | HEART RATE: 97 BPM | RESPIRATION RATE: 16 BRPM | WEIGHT: 293 LBS | HEIGHT: 68 IN | TEMPERATURE: 98.2 F | SYSTOLIC BLOOD PRESSURE: 126 MMHG | OXYGEN SATURATION: 98 %

## 2025-03-28 DIAGNOSIS — F41.9 ANXIETY: Primary | ICD-10-CM

## 2025-03-28 DIAGNOSIS — E66.01 MORBID OBESITY (HCC): ICD-10-CM

## 2025-03-28 DIAGNOSIS — K57.32 SIGMOID DIVERTICULITIS: ICD-10-CM

## 2025-03-28 PROCEDURE — 99214 OFFICE O/P EST MOD 30 MIN: CPT | Performed by: PHYSICIAN ASSISTANT

## 2025-03-28 NOTE — TELEPHONE ENCOUNTER
Patient dropped off paperwork to be completed for disability.    Form on your desk in brown folder

## 2025-03-28 NOTE — PROGRESS NOTES
"Name: Dariela Snyder      : 1985      MRN: [id number]  Encounter Provider: Ana Wesley PA-C  Encounter Date: 3/28/2025   Encounter department: [facility]  :  Assessment & Plan  Anxiety    Would like to work from home so taht she can better manage this. Will review paperwork. Currently controls symptoms in a controlled setting at home       Sigmoid diverticulitis    Finished antibiotics, will follow with gastro, reviewed diagnosis       Morbid obesity (HCC)    Discussed diet and exercise       f/u as needed         History of Present Illness   Patient very sensitive to working in office. Struggles with the lights give her tunnel visions. Trouble focusing because of all the noise. When she can't focus she gets very anxious. Also feels the temperature control being too high gives her a panic attacks. Also too cold and had to buy a space heater. Cannot properly stretch her back. Using tiger balm but does not fix the low back pain. What helps the best is using a yoga mat for a few minutes. Was fully remote, then had to go back a few days and could do it. Next week starts 3 days a week. "[employer].  Does not see psych, therapist or take medications for this.    Wants to lose weight and would like ot discuss this.     Would like to review diverticulitis that she is following with GI for.         Review of Systems    Objective   /84   Pulse 97   Temp 98.2 °F (36.8 °C) (Temporal)   Resp 16   Ht 5' 8\" (1.727 m)   Wt (!) 153 kg (338 lb)   SpO2 98%   BMI 51.39 kg/m²      Physical Exam  Constitutional:       Appearance: Normal appearance. She is obese.   HENT:      Head: Normocephalic and atraumatic.   Musculoskeletal:         General: Normal range of motion.   Neurological:      General: No focal deficit present.      Mental Status: She is alert and oriented to person, place, and time.   Psychiatric:         Mood and Affect: Mood normal.         " Behavior: Behavior normal.         Thought Content: Thought content normal.         Judgment: Judgment normal.     I have spent a total time of 31 minutes in caring for this patient on the day of the visit/encounter including Instructions for management, Impressions, and Counseling / Coordination of care.

## 2025-04-09 PROBLEM — M54.42 LUMBAGO WITH SCIATICA, LEFT SIDE: Status: ACTIVE | Noted: 2025-04-09

## 2025-04-09 PROBLEM — J02.0 STREP PHARYNGITIS: Status: RESOLVED | Noted: 2023-10-04 | Resolved: 2025-04-09

## 2025-05-12 ENCOUNTER — OFFICE VISIT (OUTPATIENT)
Dept: GASTROENTEROLOGY | Facility: CLINIC | Age: 40
End: 2025-05-12
Payer: COMMERCIAL

## 2025-05-12 VITALS
WEIGHT: 293 LBS | SYSTOLIC BLOOD PRESSURE: 148 MMHG | BODY MASS INDEX: 44.41 KG/M2 | DIASTOLIC BLOOD PRESSURE: 82 MMHG | HEIGHT: 68 IN

## 2025-05-12 DIAGNOSIS — R12 HEARTBURN: ICD-10-CM

## 2025-05-12 DIAGNOSIS — K57.32 SIGMOID DIVERTICULITIS: Primary | ICD-10-CM

## 2025-05-12 PROCEDURE — 99214 OFFICE O/P EST MOD 30 MIN: CPT | Performed by: STUDENT IN AN ORGANIZED HEALTH CARE EDUCATION/TRAINING PROGRAM

## 2025-05-12 RX ORDER — ESOMEPRAZOLE MAGNESIUM 40 MG/1
40 CAPSULE, DELAYED RELEASE ORAL DAILY
Qty: 60 CAPSULE | Refills: 0 | Status: SHIPPED | OUTPATIENT
Start: 2025-05-12 | End: 2025-07-11

## 2025-05-12 RX ORDER — SODIUM CHLORIDE, SODIUM LACTATE, POTASSIUM CHLORIDE, CALCIUM CHLORIDE 600; 310; 30; 20 MG/100ML; MG/100ML; MG/100ML; MG/100ML
125 INJECTION, SOLUTION INTRAVENOUS CONTINUOUS
OUTPATIENT
Start: 2025-05-12

## 2025-05-12 RX ORDER — POLYETHYLENE GLYCOL 3350, SODIUM SULFATE ANHYDROUS, SODIUM BICARBONATE, SODIUM CHLORIDE, POTASSIUM CHLORIDE 236; 22.74; 6.74; 5.86; 2.97 G/4L; G/4L; G/4L; G/4L; G/4L
4000 POWDER, FOR SOLUTION ORAL ONCE
Qty: 4000 ML | Refills: 0 | Status: SHIPPED | OUTPATIENT
Start: 2025-05-12 | End: 2025-05-12

## 2025-05-12 NOTE — PROGRESS NOTES
Name: Dariela Snyder      : 1985      MRN: [id number]  Encounter Provider: Ludin Vora DO  Encounter Date: 2025   Encounter department: [facility]  :  Assessment & Plan  Sigmoid diverticulitis  Recent episode of acute uncomplicated sigmoid diverticulitis.  No prior colonoscopy.  Explained pathophysiology of sigmoid diverticulitis and reasoning for follow-up colonoscopy.  After discussion of procedure, preparation, risk and benefits she is amenable with proceeding with colonoscopy.     May start to go back up on fiber in her diet.    Orders:    Colonoscopy; Future    polyethylene glycol (Golytely) 4000 mL solution; Take 4,000 mL by mouth once for 1 dose Take 4000 mL by mouth once for 1 dose. Use as directed    Heartburn  He is having heartburn symptoms since completing her antibiotics.  This could be side effect from her antibiotics leading to some gastritis. Gradual improvement with time, but still present. No swallowing issues or other red flag sign/symptoms. We will give 8-week course of Nexium.  Defer EGD unless no improvement after Nexium course.  Orders:    esomeprazole (NexIUM) 40 MG capsule; Take 1 capsule (40 mg total) by mouth in the morning      Assessment & Plan        History of Present Illness   Marcella is a very pleasant 39-year-old female with history of BMI 51.85 who is presenting after follow-up of acute sigmoid diverticulitis.  She was seen in the office on  for left lower quadrant pain and had follow-up CT abdomen/pelvis with contrast on  notable for acute uncomplicated sigmoid diverticulitis.  She has since completed a course of Cipro and Flagyl.  She has had some dyspepsia/reflux since completing antibiotics but otherwise feels better from a lower GI standpoint.  She is also twinge of discomfort in her left lower quadrant every now and then and some mild obstipation but otherwise feels well.  History of Present  Illness    History obtained from: patient  Review of Systems A complete review of systems is negative other than that noted above in the HPI.    Current Outpatient Medications on File Prior to Visit   Medication Sig Dispense Refill    b complex vitamins capsule Take 1 capsule by mouth daily B6 100 mg daily      BIOTIN PO Take by mouth in the morning Hair skin and nails      cholecalciferol (VITAMIN D3) 1,000 units tablet Take 1,000 Units by mouth daily 25 mcg      multivitamin (THERAGRAN) TABS Take 1 tablet by mouth daily      Omega-3 Fatty Acids (fish oil) 1,000 mg Take 1,000 mg by mouth daily      levonorgestrel (MIRENA) 20 MCG/24HR IUD by Intrauterine route [place] (Patient not taking: Reported on 3/28/2025)       No current facility-administered medications on file prior to visit.      Social History     Tobacco Use    Smoking status: Never    Smokeless tobacco: Never   Vaping Use    Vaping status: Never Used   Substance and Sexual Activity    Alcohol use: No    Drug use: Not Currently     Comment: uses marijuana    Sexual activity: Yes     Partners: Male     Birth control/protection: I.U.D.     Comment: 2 partners in the past 2 years     Current Outpatient Medications   Medication Sig Dispense Refill    b complex vitamins capsule Take 1 capsule by mouth daily B6 100 mg daily      BIOTIN PO Take by mouth in the morning Hair skin and nails      cholecalciferol (VITAMIN D3) 1,000 units tablet Take 1,000 Units by mouth daily 25 mcg      esomeprazole (NexIUM) 40 MG capsule Take 1 capsule (40 mg total) by mouth in the morning 60 capsule 0    multivitamin (THERAGRAN) TABS Take 1 tablet by mouth daily      Omega-3 Fatty Acids (fish oil) 1,000 mg Take 1,000 mg by mouth daily      polyethylene glycol (Golytely) 4000 mL solution Take 4,000 mL by mouth once for 1 dose Take 4000 mL by mouth once for 1 dose. Use as directed 4000 mL 0    levonorgestrel (MIRENA) 20 MCG/24HR IUD by Intrauterine route [place] (Patient not  "taking: Reported on 3/28/2025)       No current facility-administered medications for this visit.     Objective   /82   Ht 5' 8\" (1.727 m)   Wt (!) 155 kg (341 lb)   BMI 51.85 kg/m²       Physical Exam  General Appearance: NAD, cooperative, alert  Eyes: Anicteric  GI:  non-distended  Rectal: Deferred  Musculoskeletal: No edema.  Skin:     No jaundice      Results    Lab Results: I personally reviewed relevant lab results.             "